# Patient Record
Sex: MALE | Race: WHITE | URBAN - METROPOLITAN AREA
[De-identification: names, ages, dates, MRNs, and addresses within clinical notes are randomized per-mention and may not be internally consistent; named-entity substitution may affect disease eponyms.]

---

## 2017-01-06 ENCOUNTER — TRANSFERRED RECORDS (OUTPATIENT)
Dept: HEALTH INFORMATION MANAGEMENT | Facility: CLINIC | Age: 72
End: 2017-01-06

## 2018-09-07 ENCOUNTER — TRANSFERRED RECORDS (OUTPATIENT)
Dept: HEALTH INFORMATION MANAGEMENT | Facility: CLINIC | Age: 73
End: 2018-09-07

## 2019-04-05 ENCOUNTER — TRANSFERRED RECORDS (OUTPATIENT)
Dept: HEALTH INFORMATION MANAGEMENT | Facility: CLINIC | Age: 74
End: 2019-04-05

## 2019-04-12 ENCOUNTER — TRANSFERRED RECORDS (OUTPATIENT)
Dept: HEALTH INFORMATION MANAGEMENT | Facility: CLINIC | Age: 74
End: 2019-04-12

## 2019-04-19 ENCOUNTER — TRANSFERRED RECORDS (OUTPATIENT)
Dept: HEALTH INFORMATION MANAGEMENT | Facility: CLINIC | Age: 74
End: 2019-04-19

## 2019-09-30 ENCOUNTER — TRANSFERRED RECORDS (OUTPATIENT)
Dept: HEALTH INFORMATION MANAGEMENT | Facility: CLINIC | Age: 74
End: 2019-09-30

## 2019-10-02 ENCOUNTER — TRANSFERRED RECORDS (OUTPATIENT)
Dept: HEALTH INFORMATION MANAGEMENT | Facility: CLINIC | Age: 74
End: 2019-10-02

## 2019-10-03 ENCOUNTER — TRANSFERRED RECORDS (OUTPATIENT)
Dept: HEALTH INFORMATION MANAGEMENT | Facility: CLINIC | Age: 74
End: 2019-10-03

## 2019-10-08 ENCOUNTER — APPOINTMENT (OUTPATIENT)
Dept: RADIATION THERAPY | Facility: OUTPATIENT CENTER | Age: 74
End: 2019-10-08
Payer: MEDICARE

## 2019-10-08 ENCOUNTER — OFFICE VISIT (OUTPATIENT)
Dept: RADIATION THERAPY | Facility: OUTPATIENT CENTER | Age: 74
End: 2019-10-08
Payer: MEDICARE

## 2019-10-08 VITALS
RESPIRATION RATE: 16 BRPM | HEART RATE: 92 BPM | DIASTOLIC BLOOD PRESSURE: 59 MMHG | WEIGHT: 203.8 LBS | OXYGEN SATURATION: 97 % | SYSTOLIC BLOOD PRESSURE: 126 MMHG

## 2019-10-08 DIAGNOSIS — C61 PROSTATE CANCER (H): ICD-10-CM

## 2019-10-08 DIAGNOSIS — I10 HTN (HYPERTENSION): ICD-10-CM

## 2019-10-08 DIAGNOSIS — E07.9 DISORDER OF THYROID: ICD-10-CM

## 2019-10-08 DIAGNOSIS — C61 PROSTATE CANCER (H): Primary | ICD-10-CM

## 2019-10-08 PROBLEM — C79.51 SECONDARY MALIGNANT NEOPLASM OF BONE (H): Status: ACTIVE | Noted: 2019-10-08

## 2019-10-08 RX ORDER — LEVOTHYROXINE SODIUM 112 UG/1
112 TABLET ORAL DAILY
COMMUNITY
Start: 2018-07-31

## 2019-10-08 RX ORDER — CYANOCOBALAMIN 1000 UG/ML
1000 INJECTION, SOLUTION INTRAMUSCULAR; SUBCUTANEOUS
COMMUNITY
Start: 2019-11-18

## 2019-10-08 RX ORDER — ASCORBIC ACID 500 MG
500 TABLET ORAL DAILY
COMMUNITY
Start: 2019-04-11

## 2019-10-08 RX ORDER — ASPIRIN 81 MG/1
81 TABLET ORAL DAILY
COMMUNITY
Start: 2018-08-30

## 2019-10-08 RX ORDER — SIMVASTATIN 20 MG
20 TABLET ORAL DAILY
COMMUNITY
Start: 2018-07-31

## 2019-10-08 RX ORDER — PREDNISONE 10 MG/1
10 TABLET ORAL DAILY
COMMUNITY
Start: 2019-10-03

## 2019-10-08 RX ORDER — ABIRATERONE 500 MG/1
1000 TABLET ORAL DAILY
COMMUNITY
Start: 2019-10-03 | End: 2021-04-19

## 2019-10-08 RX ORDER — CARBAMAZEPINE 300 MG/1
300 CAPSULE, EXTENDED RELEASE ORAL 2 TIMES DAILY
COMMUNITY
Start: 2019-01-28

## 2019-10-08 RX ORDER — ZOSTER VACCINE RECOMBINANT, ADJUVANTED 50 MCG/0.5
KIT INTRAMUSCULAR
Refills: 1 | COMMUNITY
Start: 2019-08-19

## 2019-10-08 RX ORDER — IBUPROFEN 200 MG
200 TABLET ORAL EVERY 4 HOURS PRN
COMMUNITY

## 2019-10-08 RX ORDER — LISINOPRIL/HYDROCHLOROTHIAZIDE 10-12.5 MG
1 TABLET ORAL DAILY
COMMUNITY
Start: 2018-07-31

## 2019-10-08 RX ORDER — ZOLEDRONIC ACID 0.04 MG/ML
4 INJECTION, SOLUTION INTRAVENOUS
COMMUNITY

## 2019-10-08 NOTE — LETTER
10/8/2019      RE: Duane A Gabrielson  [address] [address] [address]       Patient underwent CT simulation.     Eldon Bar M.D.  Department of Radiation Oncology  [facility]       Eldon Bar MD

## 2019-10-08 NOTE — PROGRESS NOTES
Department of Radiation Oncology  Radiation Therapy Center  [facility]  [address], [address]  [place] [address]  [phone]       Consultation Note    Name: Duane A Gabrielson MRN: [id number]   : 1945   Date of Service: 10/8/2019  Referring: Dr. Catalan     Reason for consultation: Metastatic prostate cancer to bone associated with right pelvic pain.  Evaluate role for palliative radiation therapy.    History of Present Illness   Mr. Peters is a 73 year old male diagnosed metastatic prostate cancer to bone associated with right pelvic pain.  He presents today to discuss potential role of palliative radiation therapy.    Patient was initially diagnosed with prostate cancer in  when he underwent prostatectomy.  Reported pathology demonstrated prostate adenocarcinoma, Eligio score 3+4 = 7, negative margins, no SUKHDEV or SVI.  The patient had biochemical recurrence in  and underwent salvage prostate bed radiation therapy to a total dose of 70.2 Gy in 39 fractions, completed on 2005.  There is initial PSA response.  Patient had biochemical progression in  and ultimately required initiation of systemic therapy with Lupron.  The patient has since been on Lupron every 4 months since .  Most recent bone scan on 2019 demonstrated widespread osseous metastatic disease in the spine, sternum, and bilateral pelvis, right greater than left.  More recent PSA on 2019 was 212, which demonstrated  increase from prior values and is ultimately suggestive of castrate resistant disease.  The patient was subsequently seen by Dr. Centeno who discussed consideration of treatment with abiraterone.  More recently patient presented with increasing right persistent posterior pelvic/buttock pain for the past 2 months.  Patient notes pain primarily with sitting position in the car and/or on the toilet seat.  He denies any associated radiculopathy.  Currently pain  is a level 6-7 out of 10, managed with ibuprofen 3 times a day.  The patient states he overall tolerated his prior course of radiation very well.  He presents today to discuss potential role of palliative radiation therapy was painful bone metastasis.        Past Medical History:   No past medical history on file.    Past Surgical History:   No past surgical history on file.    Chemotherapy History:  Per HPI    Radiation History:  70.2 Gy in 39 fractions to prostate bed, completed RT on       Pregnant: Not Applicable  Implanted Cardiac Devices: No    Medications:  No current outpatient medications on file.     No current facility-administered medications for this visit.          Allergies:   Allergies not on file      Family History:  No family history on file.    Review of Systems   A 10-point review of systems was performed. Pertinent findings are noted in the HPI.    Physical Exam   ECOG Status: 1    Vitals:  There were no vitals taken for this visit.    Gen: Alert, in NAD  Head: NC/AT  Eyes: PERRL, EOMI, sclera anicteric  Ears: No external auricular lesions  Nose/sinus: No rhinorrhea or epistaxis  Oral cavity/oropharynx: MMM, no visible oral cavity lesions, FOM and BOT are soft to palpation  Neck: Full ROM, supple, no palpable adenopathy  Pulm: No wheezing, stridor or respiratory distress  CV: Extremities are warm and well-perfused, no cyanosis, no pedal edema  Abdominal: Normal bowel sounds, soft, nontender, no masses  Musculoskeletal: +TTP in right ischium region.  Skin: Normal color and turgor  Neuro: A/Ox3, CN II-XII intact, normal gait    Imaging/Path/Labs   Imagin19  Bone scan  FINDINGS: Normal blood flow and blood pool tracer uptake about both hips with no  evidence of hyperemia to suggest acute fracture or infection. Delayed phase  whole body and pelvic images demonstrate widespread osseous metastases  throughout the axial and proximal appendicular skeleton. Degree of uptake  relative to  background has decreased since 04/19/2019 suggesting favorable  treatment response. Sites are scattered throughout the spine, ribs, arms, bony  pelvis and legs. Perineal surface contamination.        9/30/19  CTCAP    CONCLUSION:  1.  Progression of sclerotic metastatic disease throughout the skeleton.  2.  Slight enlargement of a few small lymph nodes of the right pelvic sidewall  and external iliac chain. Involvement with disease is not excluded. Attention on  follow-up recommended.        Path:   Per HPI    Labs:       Assessment    Mr. Peters is a 73 year old male diagnosed metastatic prostate cancer to bone associated with right pelvic pain.  He presents today to discuss potential role of palliative radiation therapy.    Plan     1.  The patient reports predominantly pain in his right posterior pelvic region, likely corresponding to his right ischium.  This pain is affecting his overall quality of life.  As such I recommend a course of palliative radiation therapy with goal to improve the patient's current pain symptoms.    2.  have reviewed prior radiation records, and I recommend proceeding with palliative radiation therapy. I recommend a total dose of 4 Gy x 5.  Consent was obtained today.  CT simulation was performed.    3.  We will start treatment the following Monday (10/14/19) with plan to finish at the end of the week.  Patient agrees with current plan in place.      Eldon Bar MD  Department of Radiation Oncology  [facility]

## 2019-10-08 NOTE — LETTER
10/8/2019      RE: Duane A Gabrielson  [address] [address] [address]          Department of Radiation Oncology  Radiation Therapy Center  [facility]  [address], [address]  [place] [address] [phone]       Consultation Note    Name: Duane A Gabrielson MRN: [id number]   : 1945   Date of Service: 10/8/2019  Referring: Dr. Catalan     Reason for consultation: Metastatic prostate cancer to bone associated with right pelvic pain.  Evaluate role for palliative radiation therapy.    History of Present Illness   Mr. Peters is a 73 year old male diagnosed metastatic prostate cancer to bone associated with right pelvic pain.  He presents today to discuss potential role of palliative radiation therapy.    Patient was initially diagnosed with prostate cancer in  when he underwent prostatectomy.  Reported pathology demonstrated prostate adenocarcinoma, Eligio score 3+4 = 7, negative margins, no SUKHDEV or SVI.  The patient had biochemical recurrence in  and underwent salvage prostate bed radiation therapy to a total dose of 70.2 Gy in 39 fractions, completed on 2005.  There is initial PSA response.  Patient had biochemical progression in  and ultimately required initiation of systemic therapy with Lupron.  The patient has since been on Lupron every 4 months since .  Most recent bone scan on 2019 demonstrated widespread osseous metastatic disease in the spine, sternum, and bilateral pelvis, right greater than left.  More recent PSA on 2019 was 212, which demonstrated  increase from prior values and is ultimately suggestive of castrate resistant disease.  The patient was subsequently seen by Dr. Centeno who discussed consideration of treatment with abiraterone.  More recently patient presented with increasing right persistent posterior pelvic/buttock pain for the past 2 months.  Patient notes pain primarily with sitting position in the car  and/or on the toilet seat.  He denies any associated radiculopathy.  Currently pain is a level 6-7 out of 10, managed with ibuprofen 3 times a day.  The patient states he overall tolerated his prior course of radiation very well.  He presents today to discuss potential role of palliative radiation therapy was painful bone metastasis.        Past Medical History:   No past medical history on file.    Past Surgical History:   No past surgical history on file.    Chemotherapy History:  Per HPI    Radiation History:  70.2 Gy in 39 fractions to prostate bed, completed RT on       Pregnant: Not Applicable  Implanted Cardiac Devices: No    Medications:  No current outpatient medications on file.     No current facility-administered medications for this visit.          Allergies:   Allergies not on file      Family History:  No family history on file.    Review of Systems   A 10-point review of systems was performed. Pertinent findings are noted in the HPI.    Physical Exam   ECOG Status: 1    Vitals:  There were no vitals taken for this visit.    Gen: Alert, in NAD  Head: NC/AT  Eyes: PERRL, EOMI, sclera anicteric  Ears: No external auricular lesions  Nose/sinus: No rhinorrhea or epistaxis  Oral cavity/oropharynx: MMM, no visible oral cavity lesions, FOM and BOT are soft to palpation  Neck: Full ROM, supple, no palpable adenopathy  Pulm: No wheezing, stridor or respiratory distress  CV: Extremities are warm and well-perfused, no cyanosis, no pedal edema  Abdominal: Normal bowel sounds, soft, nontender, no masses  Musculoskeletal: +TTP in right ischium region.  Skin: Normal color and turgor  Neuro: A/Ox3, CN II-XII intact, normal gait    Imaging/Path/Labs   Imagin19  Bone scan  FINDINGS: Normal blood flow and blood pool tracer uptake about both hips with no  evidence of hyperemia to suggest acute fracture or infection. Delayed phase  whole body and pelvic images demonstrate widespread osseous metastases  throughout  the axial and proximal appendicular skeleton. Degree of uptake  relative to background has decreased since 04/19/2019 suggesting favorable  treatment response. Sites are scattered throughout the spine, ribs, arms, bony  pelvis and legs. Perineal surface contamination.        9/30/19  CTCAP    CONCLUSION:  1.  Progression of sclerotic metastatic disease throughout the skeleton.  2.  Slight enlargement of a few small lymph nodes of the right pelvic sidewall  and external iliac chain. Involvement with disease is not excluded. Attention on  follow-up recommended.        Path:   Per HPI    Labs:       Assessment    Mr. Peters is a 73 year old male diagnosed metastatic prostate cancer to bone associated with right pelvic pain.  He presents today to discuss potential role of palliative radiation therapy.    Plan     1.  The patient reports predominantly pain in his right posterior pelvic region, likely corresponding to his right ischium.  This pain is affecting his overall quality of life.  As such I recommend a course of palliative radiation therapy with goal to improve the patient's current pain symptoms.    2.  have reviewed prior radiation records, and I recommend proceeding with palliative radiation therapy. I recommend a total dose of 4 Gy x 5.  Consent was obtained today.  CT simulation was performed.    3.  We will start treatment the following Monday (10/14/19) with plan to finish at the end of the week.  Patient agrees with current plan in place.      Eldon Bar MD  Department of Radiation Oncology  [facility]       Eldon Bar MD

## 2019-10-08 NOTE — PROGRESS NOTES
Patient underwent CT simulation.     Eldon Bar M.D.  Department of Radiation Oncology  [facility]

## 2019-10-08 NOTE — NURSING NOTE
REASON FOR APPOINTMENT   Metastatic prostate cancer with widespread mets. Having pelvic bony pain.   Here to discuss RT.  TREATMENT TO-DATE FOR THIS CANCER  Surgery ? Prostatectomy 2004   Chemotherapy ? Immunotherapy currently w/Dr. Catalan   Radiation therapy: Treated at [facility] 2005, 7020 cGy in 39 fx    PERSONAL HISTORY OF CANCER   Previous Cancer ? no   Prior Radiation ? See above   Prior Chemotherapy ? no   Prior Hormonal Therapy ? no     REFERRALS NEEDED  Not at this time    VITALS  /59 (BP Location: Left arm, Cuff Size: Adult Large)   Pulse 92   Resp 16   Wt 92.4 kg (203 lb 12.8 oz)   SpO2 97%     PACEMAKER/IMPLANTED CARDIAC DEVICE : No    PAIN  Right buttock/bone pain. Rates 6-8/10 in certain sitting positions.   Only taking advil 200mg 2-3 times per day    PSYCHOSOCIAL  Marital Status: single, no children  Patient lives in [place], [place]   Working status: retired  Do you feel safe in your home? Yes    REVIEW OF SYSTEMS  Skin: negative  Eyes: negative  Ears/Nose/Throat: negative  Respiratory: No shortness of breath, dyspnea on exertion, cough, or hemoptysis  Cardiovascular: negative  Gastrointestinal: negative  Genitourinary: nocturia x 1  Musculoskeletal: arthritis  Neurologic: negative  Psychiatric: negative  Hematologic/Lymphatic/Immunologic: negative  Endocrine: negative    Radiation Oncology Patient Teaching    Person involved with teaching: Patient and his sister  Patient asked Questions: Yes  Patient was cooperative: Yes  Patient was receptive (willing to accept information given): Yes    Education Assessment  Comprehension ability: High  Knowledge level: Medium  Factors affecting teaching: None    Education Materials Given  Caring for Your Skin During Radiation ...  Disease Specific Booklet  Eating Hints  Diet and Nutrition Information    Educational Topics Discussed  Disease process, Side effects, Medications, Pain management, Activity, Adjustment to illness and Community  resources    Response To Teaching  Verbalizes understanding    Do you have an advanced directive or living will? no  Are you DNR/DNI? no

## 2019-10-14 ENCOUNTER — APPOINTMENT (OUTPATIENT)
Dept: RADIATION THERAPY | Facility: OUTPATIENT CENTER | Age: 74
End: 2019-10-14
Payer: MEDICARE

## 2019-10-15 ENCOUNTER — APPOINTMENT (OUTPATIENT)
Dept: RADIATION THERAPY | Facility: OUTPATIENT CENTER | Age: 74
End: 2019-10-15
Payer: MEDICARE

## 2019-10-16 ENCOUNTER — OFFICE VISIT (OUTPATIENT)
Dept: RADIATION THERAPY | Facility: OUTPATIENT CENTER | Age: 74
End: 2019-10-16
Payer: MEDICARE

## 2019-10-16 ENCOUNTER — APPOINTMENT (OUTPATIENT)
Dept: RADIATION THERAPY | Facility: OUTPATIENT CENTER | Age: 74
End: 2019-10-16
Payer: MEDICARE

## 2019-10-16 VITALS
OXYGEN SATURATION: 99 % | SYSTOLIC BLOOD PRESSURE: 116 MMHG | DIASTOLIC BLOOD PRESSURE: 59 MMHG | WEIGHT: 191.6 LBS | RESPIRATION RATE: 18 BRPM | HEART RATE: 82 BPM

## 2019-10-16 DIAGNOSIS — C61 PROSTATE CANCER (H): Primary | ICD-10-CM

## 2019-10-16 NOTE — PROGRESS NOTES
[facility]  Radiation Therapy    On Treatment Visit Note      Duane A Gabrielson      Date: 10/16/2019   MRN: [id number]   : 1945  Diagnosis: Metastatic prostate cancer      Primary Care Physician   No primary care provider on file.      Reason for Visit:  On Radiation Treatment Visit -Radiation to metastatic prostate cancer of right pevic bone  Prostatectomy in .  Pelvic radiation in  at time of biochemical recurrence, with improvement in PSA.  Intermittent Lupron, beginning in , approximately annually.  Lupron every 4 months beginning May 2009.  Casodex begun May, 2019, ongoing.  On Zometa 4 mg every 4 months  On oral Zytega - daily with  prednisone    Treatment Summary to Date  Treatment Site: R pelvis Current Dose: 1200/2000 cGy Fractions: 3/5        Chemotherapy  Chemo concurrent with radx?: Yes  Oncologist: Dr. Catalan  Drug Name/Frequency 1: Zytega - daily, prednisone    Nursing ROS:   Nutrition Alteration  Diet Type: Patient's Preference  Skin  Skin Reaction: 0 - No changes    Cardiovascular  Respiratory effort: 2 - Mild dyspnea on exertion  Gastrointestinal  Nausea: 0 - None  Diarrhea: 0 - None     Pain Assessment  0-10 Pain Scale: (pt did not rate pain)  Pain Note: takes IBU 2x/day which manages pain. feels small improvement since starting radiation    Subjective: See nursing ROS.  Pain is tolerable  Poor appetite    Objective:   /59   Pulse 82   Resp 18   Wt 86.9 kg (191 lb 9.6 oz)   SpO2 99%   Gen: Oriented and mentally alert       Labs:  CBC RESULTS: No results for input(s): WBC, RBC, HGB, HCT, MCV, MCH, MCHC, RDW, PLT in the last 64215 hours.  ELECTROLYTES:  No results for input(s): NA, POTASSIUM, CHLORIDE, SALVATORE, CO2, BUN, CR, GLC in the last 74335 hours.    Assessment:    Tolerating radiation therapy well.  All questions and concerns addressed.    Port cheked    Plan:   1. Continue current therapy.    2  Two  more fractions to go..          Lobo Aguiar MD

## 2019-10-16 NOTE — LETTER
10/16/2019      RE: Duane A Gabrielson  [address] [address] [address]       [facility]  Radiation Therapy    On Treatment Visit Note      Duane A Gabrielson      Date: 10/16/2019   MRN: [id number]   : 1945  Diagnosis: Metastatic prostate cancer      Primary Care Physician   No primary care provider on file.      Reason for Visit:  On Radiation Treatment Visit -Radiation to metastatic prostate cancer of right pevic bone  Prostatectomy in .  Pelvic radiation in  at time of biochemical recurrence, with improvement in PSA.  Intermittent Lupron, beginning in , approximately annually.  Lupron every 4 months beginning May 2009.  Casodex begun May, 2019, ongoing.  On Zometa 4 mg every 4 months  On oral Zytega - daily with  prednisone    Treatment Summary to Date  Treatment Site: R pelvis Current Dose: 1200/2000 cGy Fractions: 3/5        Chemotherapy  Chemo concurrent with radx?: Yes  Oncologist: Dr. Catalan  Drug Name/Frequency 1: Zytega - daily, prednisone    Nursing ROS:   Nutrition Alteration  Diet Type: Patient's Preference  Skin  Skin Reaction: 0 - No changes    Cardiovascular  Respiratory effort: 2 - Mild dyspnea on exertion  Gastrointestinal  Nausea: 0 - None  Diarrhea: 0 - None     Pain Assessment  0-10 Pain Scale: (pt did not rate pain)  Pain Note: takes IBU 2x/day which manages pain. feels small improvement since starting radiation    Subjective: See nursing ROS.  Pain is tolerable  Poor appetite    Objective:   /59   Pulse 82   Resp 18   Wt 86.9 kg (191 lb 9.6 oz)   SpO2 99%   Gen: Oriented and mentally alert       Labs:  CBC RESULTS: No results for input(s): WBC, RBC, HGB, HCT, MCV, MCH, MCHC, RDW, PLT in the last 46894 hours.  ELECTROLYTES:  No results for input(s): NA, POTASSIUM, CHLORIDE, SALVATORE, CO2, BUN, CR, GLC in the last 83466 hours.    Assessment:    Tolerating radiation therapy well.  All questions and concerns addressed.    Port cheked    Plan:   1. Continue  current therapy.    2  Two  more fractions to go..          MD Lobo Perez MD

## 2019-10-17 ENCOUNTER — APPOINTMENT (OUTPATIENT)
Dept: RADIATION THERAPY | Facility: OUTPATIENT CENTER | Age: 74
End: 2019-10-17
Payer: MEDICARE

## 2019-10-18 ENCOUNTER — APPOINTMENT (OUTPATIENT)
Dept: RADIATION THERAPY | Facility: OUTPATIENT CENTER | Age: 74
End: 2019-10-18
Payer: MEDICARE

## 2019-10-22 ENCOUNTER — DOCUMENTATION ONLY (OUTPATIENT)
Dept: RADIATION THERAPY | Facility: OUTPATIENT CENTER | Age: 74
End: 2019-10-22

## 2019-10-22 NOTE — PROGRESS NOTES
Department of Radiation Oncology  Radiation Therapy Center  [facility] [place] [address]  [phone]       Radiotherapy Treatment Summary          2019    PATIENT: Duane A Gabrielson  MEDICAL RECORD NO: [id number]   : 1945    DIAGNOSIS: metastatic prostate cancer to bone associated with right pelvic pain.   INTENT OF RADIOTHERAPY: palliative  PATHOLOGY: prostate adenocarcinoma                             STAGE: IV  CONCURRENT SYSTEMIC THERAPY:  Oncologist: Dr. Catalan  On oral Zytega - daily with  prednisone    ONCOLOGIC HISTORY:  Mr. Peters is a 73 year old male diagnosed metastatic prostate cancer to bone associated with right pelvic pain.      Patient was initially diagnosed with prostate cancer in  when he underwent prostatectomy.  Reported pathology demonstrated prostate adenocarcinoma, Eligio score 3+4 = 7, negative margins, no SUKHDEV or SVI.  The patient had biochemical recurrence in  and underwent salvage prostate bed radiation therapy to a total dose of 70.2 Gy in 39 fractions, completed on 2005.  There was initial PSA response.  Patient had biochemical progression in  and ultimately required initiation of systemic therapy with Lupron.  The patient has since been on Lupron every 4 months since .  Most recent bone scan on 2019 demonstrated widespread osseous metastatic disease in the spine, sternum, and bilateral pelvis, right greater than left.  More recent PSA on 2019 was 212, which demonstrated  increase from prior values and is ultimately suggestive of castrate resistant disease.  The patient was subsequently seen by Dr. Centeno who discussed consideration of treatment with abiraterone.  More recently patient presented with increasing right persistent posterior pelvic/buttock pain for the past 2 months.  Patient notes pain primarily with sitting position in the car and/or on the toilet seat.  He denies any associated  radiculopathy.  Currently pain is a level 6-7 out of 10, managed with ibuprofen 3 times a day.           SITE OF TREATMENT: Right pelvis    DATES  OF TREATMENT: 10/14/19-10/18/19    TOTAL DOSE OF TREATMENT: 2000 cGy    DOSE PER FRACTION OF TREATMENT: 400 cGy x 5 fractions       COMMENT/TOXICITY:    Skin Reaction: 0 - No changes  Nausea: 0 - None  Diarrhea: 0 - None    Feels small  improvement in pain since starting radiation.                 PAIN MANAGEMENT:    Continues on ibuprofen 200 mg Q 4 hrs prn                       FOLLOW UP PLAN:  1.  RTC one month    Radiation Oncologist: Eldon Bar M.D.  Department of Radiation Oncology  [facility]

## 2019-11-21 ENCOUNTER — OFFICE VISIT (OUTPATIENT)
Dept: RADIATION THERAPY | Facility: OUTPATIENT CENTER | Age: 74
End: 2019-11-21
Payer: MEDICARE

## 2019-11-21 VITALS
SYSTOLIC BLOOD PRESSURE: 134 MMHG | DIASTOLIC BLOOD PRESSURE: 76 MMHG | WEIGHT: 189.8 LBS | HEART RATE: 98 BPM | OXYGEN SATURATION: 98 % | RESPIRATION RATE: 18 BRPM

## 2019-11-21 DIAGNOSIS — C61 PROSTATE CANCER (H): Primary | ICD-10-CM

## 2019-11-21 RX ORDER — HYDROCODONE BITARTRATE AND ACETAMINOPHEN 5; 325 MG/1; MG/1
0.5 TABLET ORAL EVERY 4 HOURS PRN
COMMUNITY
Start: 2019-11-13 | End: 2021-04-19

## 2019-11-21 RX ORDER — TRAMADOL HYDROCHLORIDE 50 MG/1
1 TABLET ORAL 2 TIMES DAILY PRN
COMMUNITY
Start: 2019-11-07

## 2019-11-21 NOTE — LETTER
"2019    RE: Duane A Gabrielson  [address] [address] [address]     [facility] [place]  Radiation Oncology Follow-up Note  2019    Duane A Gabrielson  MRN: [id number]  : 1945    DISEASE TREATED: Adenocarcinoma of the prostate, metastatic to bone  Stage: IV    SITE OF TREATMENT: Right pelvis  DATES  OF TREATMENT: 10/14/19-10/18/19     TOTAL DOSE OF TREATMENT: 2000 cGy  DOSE PER FRACTION OF TREATMENT: 400 cGy x 5 fractions    INTERVAL SINCE COMPLETION OF RADIOTHERAPY: 1 Month    SUBJECTIVE: Mr. Peters is a [age] year old  gentleman with metastatic prostate cancer. He was initially diagnosed with intermediate risk disease in , for which he receive upfront prostatectomy. He experienced biochemical recurrence in , and received salvage RT, but ultimately was found to again have rising PSA in . Since that time he has been on hormonal and systemic therapy, and became castrate resistant several months ago. He now has diffuse bony metastatic disease, and received palliative RT to a particularly painful right pelvic lesion in 10/2019. He continues to follow closely with his medical oncologist (Dr. Cody) and it appears that his PSA has significantly declined since beginning abiaterone and prednisone. At a recent visit with her, Mr. Peters admitted to new acute on chronic left buttock and left anterior pelvic pain.    Today he states that this new left pelvic pain is becoming more bothersome for him. There is a small area on the back of his left hip that become particularly bothersome while sitting. He rates the pain as 5/10. He also has pain and tenderness on the left side of the pubic symphysis. He is taking low dose Norco right now, which he affirms adequately address his current pain. He denies any right sided hip pain and believes his previous radiation course \"did the trick\" for that site. He tolerated his previous XRT treatment course " very well.    OBJECTIVE: A/Ox3. NAD. No TTP of the right hip or sacrum. TTP of the left sacrum and left pubic symphysis appreciated.    Bone scan: 9/27/2019    FINDINGS: Normal blood flow and blood pool tracer uptake about both hips with no  evidence of hyperemia to suggest acute fracture or infection. Delayed phase  whole body and pelvic images demonstrate widespread osseous metastases  throughout the axial and proximal appendicular skeleton. Degree of uptake  relative to background has decreased since 04/19/2019 suggesting favorable  treatment response. Sites are scattered throughout the spine, ribs, arms, bony  pelvis and legs. Perineal surface contamination.                                       ASSESSMENT AND PLAN:  Mr. Peters is a [age] year old gentleman with metastatic prostate cancer. He has had a good response to palliative right hip radiotherapy, and has developed minimal toxicity from treatment. He has a new complaint of left-sided anterior and posterior hip pain, and review of his recent CT simulation scan shows bony lesions with anatomic correlation with the sites of new pain. Thus we have offered a 2nd course of palliative RT (20Gy in 5 fractions) to the left pubic symphysis and ischium. The goal of this new treatment will be to assist in improved pain relief tin the left hip. The patient consented to therapy and will be scheduled to undergo CT simulation with us next week.  Lizandro Lucero MD-PhD PGY-4  Radiation Oncology Resident, [facility]  [phone]    I was present with the resident during the visit. I discussed the case with the resident and agree with the note as documented by the resident.    Eldon Bar M.D.  Department of Radiation Oncology  [facility]      Eldon Bar MD

## 2019-11-21 NOTE — PROGRESS NOTES
"    [facility], [place]  Radiation Oncology Follow-up Note  2019    Duane A Gabrielson  MRN: [id number]  : 1945    DISEASE TREATED: Adenocarcinoma of the prostate, metastatic to bone  Stage: IV    SITE OF TREATMENT: Right pelvis  DATES  OF TREATMENT: 10/14/19-10/18/19     TOTAL DOSE OF TREATMENT: 2000 cGy  DOSE PER FRACTION OF TREATMENT: 400 cGy x 5 fractions    INTERVAL SINCE COMPLETION OF RADIOTHERAPY: 1 Month    SUBJECTIVE: Mr. Peters is a 74 year old  gentleman with metastatic prostate cancer. He was initially diagnosed with intermediate risk disease in , for which he receive upfront prostatectomy. He experienced biochemical recurrence in , and received salvage RT, but ultimately was found to again have rising PSA in . Since that time he has been on hormonal and systemic therapy, and became castrate resistant several months ago. He now has diffuse bony metastatic disease, and received palliative RT to a particularly painful right pelvic lesion in 10/2019. He continues to follow closely with his medical oncologist (Dr. Cody) and it appears that his PSA has significantly declined since beginning abiaterone and prednisone. At a recent visit with her, Mr. Peters admitted to new acute on chronic left buttock and left anterior pelvic pain.    Today he states that this new left pelvic pain is becoming more bothersome for him. There is a small area on the back of his left hip that become particularly bothersome while sitting. He rates the pain as 5/10. He also has pain and tenderness on the left side of the pubic symphysis. He is taking low dose Norco right now, which he affirms adequately address his current pain. He denies any right sided hip pain and believes his previous radiation course \"did the trick\" for that site. He tolerated his previous XRT treatment course very well.    OBJECTIVE: A/Ox3. NAD. No TTP of the right hip or sacrum. TTP of " the left sacrum and left pubic symphysis appreciated.    Bone scan: 9/27/2019    FINDINGS: Normal blood flow and blood pool tracer uptake about both hips with no  evidence of hyperemia to suggest acute fracture or infection. Delayed phase  whole body and pelvic images demonstrate widespread osseous metastases  throughout the axial and proximal appendicular skeleton. Degree of uptake  relative to background has decreased since 04/19/2019 suggesting favorable  treatment response. Sites are scattered throughout the spine, ribs, arms, bony  pelvis and legs. Perineal surface contamination.                                       ASSESSMENT AND PLAN:  Mr. Peters is a 74 year old gentleman with metastatic prostate cancer. He has had a good response to palliative right hip radiotherapy, and has developed minimal toxicity from treatment. He has a new complaint of left-sided anterior and posterior hip pain, and review of his recent CT simulation scan shows bony lesions with anatomic correlation with the sites of new pain. Thus we have offered a 2nd course of palliative RT (20Gy in 5 fractions) to the left pubic symphysis and ischium. The goal of this new treatment will be to assist in improved pain relief tin the left hip. The patient consented to therapy and will be scheduled to undergo CT simulation with us next week.  Lizandro Lucero MD-PhD PGY-4  Radiation Oncology Resident, [facility]  [phone]    I was present with the resident during the visit. I discussed the case with the resident and agree with the note as documented by the resident.    Eldon Bar M.D.  Department of Radiation Oncology  [facility]

## 2019-11-21 NOTE — NURSING NOTE
FOLLOW-UP VISIT    Patient Name: Duane A Gabrielson      : 1945     Age: 74 year old        ______________________________________________________________________________     Chief Complaint   Patient presents with     Radiation Therapy     follow up     /76   Pulse 98   Resp 18   Wt 86.1 kg (189 lb 12.8 oz)   SpO2 98%      Date Radiation Completed: 1 month ago    Pain  Current history of pain associated with this visit:   Intensity: Patient is unable to quantify.  Current: lump/walnut when sitting, pain/pressure when standing  Location: L pelvis  Treatment: norco 1/2 tab 2x/day prn, tramadol prn    Meds  Current Med List Reviewed: Yes  Medication Note:     Labs  Other Labs: No    Imaging  None    Skin: Warm  Dry  Intact  Energy Level: improving - recently in hospital due to hgb 7, UTI, fall.  Blood transfusion, pain managed, abx complete - energy and activity improving    Other Appointments:     Date  MD Name: Sabrina Augustin  visit last week     Other Notes:

## 2019-11-26 ENCOUNTER — OFFICE VISIT (OUTPATIENT)
Dept: RADIATION THERAPY | Facility: OUTPATIENT CENTER | Age: 74
End: 2019-11-26
Payer: MEDICARE

## 2019-11-26 DIAGNOSIS — C61 PROSTATE CANCER (H): Primary | ICD-10-CM

## 2019-11-26 NOTE — LETTER
11/26/2019      RE: Duane A Gabrielson  [address] [facility] [address]       Patient underwent CT simulation.     Eldon Bar M.D.  Department of Radiation Oncology  [facility]

## 2019-11-26 NOTE — PROGRESS NOTES
Patient underwent CT simulation.     Eldon Bar M.D.  Department of Radiation Oncology  [facility]

## 2019-12-04 ENCOUNTER — APPOINTMENT (OUTPATIENT)
Dept: RADIATION THERAPY | Facility: OUTPATIENT CENTER | Age: 74
End: 2019-12-04
Payer: MEDICARE

## 2019-12-04 ENCOUNTER — OFFICE VISIT (OUTPATIENT)
Dept: RADIATION THERAPY | Facility: OUTPATIENT CENTER | Age: 74
End: 2019-12-04
Payer: MEDICARE

## 2019-12-04 VITALS — HEART RATE: 84 BPM | SYSTOLIC BLOOD PRESSURE: 122 MMHG | DIASTOLIC BLOOD PRESSURE: 68 MMHG | WEIGHT: 182.4 LBS

## 2019-12-04 DIAGNOSIS — C61 PROSTATE CANCER (H): Primary | ICD-10-CM

## 2019-12-04 NOTE — LETTER
2019      RE: Duane A Gabrielson  [address] [address] [address]       [facility] PHYSICIANS  SPECIALIZING IN BREAKTHROUGHS  Radiation Oncology    On Treatment Visit Note    Duane A Gabrielson      Date: 2019   MRN: [id number]   : 1945    Reason for Visit:  On Radiation Treatment Visit     Diagnosis: Metastatic prostate cancer    Plan for palliative XRT to the right pelvis    Treatment Summary to Date  Site Treated: Left pubic symphasis and ischium Current Dose: 400cGy/2000cGy Fraction:      Subjective:   Today Mr. Peters begins his 2nd round of palliative radiotherapy. He states that he is constipated, but is otherwise doing well. His left hip pain is stable, and he denies any  dysfunction. He continues on a minimal amount of Norco.    Objective:   Gen: A/Ox3. NAD  Skin: No erythema    Toxicities: No appreciable treatment related toxicities.    Labs: No interval labs    Assessment:   Mr. Peters is a 74 year old gentleman with metastatic prostate cancer.  He has received 2 previous courses of radiation therapy to the pelvis, including salvage prostate RT and palliative right hip XRT. He has developed left-sided anterior and inferior hip pain, and is receiving palliative radiotherapy to those sites.     Tolerating radiation therapy well.  All questions and concerns addressed.    Plan:    1. Continue current therapy.  2. Recommended senokot to address constipation  3. The patient is scheduled to complete palliative XRT next week. RTC as needed    Lizandro Lucero MD-PhD PGY-4  Radiation Oncology Resident, [facility]      Staff Note:  I saw patient with resident and personally talk to patient and his wife.  Mosaic  And image was reviewed. I agree with above note.    MD Lobo Lunsford MD

## 2019-12-04 NOTE — PROGRESS NOTES
[facility] PHYSICIANS  SPECIALIZING IN BREAKTHROUGHS  Radiation Oncology    On Treatment Visit Note    Duane A Gabrielson      Date: 2019   MRN: [id number]   : 1945    Reason for Visit:  On Radiation Treatment Visit     Diagnosis: Metastatic prostate cancer    Plan for palliative XRT to the right pelvis    Treatment Summary to Date  Site Treated: Left pubic symphasis and ischium Current Dose: 400cGy/2000cGy Fraction:      Subjective:   Today Mr. Peters begins his 2nd round of palliative radiotherapy. He states that he is constipated, but is otherwise doing well. His left hip pain is stable, and he denies any  dysfunction. He continues on a minimal amount of Norco.    Objective:   Gen: A/Ox3. NAD  Skin: No erythema    Toxicities: No appreciable treatment related toxicities.    Labs: No interval labs    Assessment:   Mr. Peters is a 74 year old gentleman with metastatic prostate cancer. He has received 2 previous courses of radiation therapy to the pelvis, including salvage prostate RT and palliative right hip XRT. He has developed left-sided anterior and inferior hip pain, and is receiving palliative radiotherapy to those sites.     Tolerating radiation therapy well.  All questions and concerns addressed.    Plan:    1. Continue current therapy.  2. Recommended senokot to address constipation  3. The patient is scheduled to complete palliative XRT next week. RTC as needed    Lizandro Lucero MD-PhD PGY-4  Radiation Oncology Resident, [facility]      Staff Note:  I saw patient with resident and personally talk to patient and his wife.  Mosaic  And image was reviewed. I agree with above note.    Lobo Aguiar MD

## 2019-12-05 ENCOUNTER — APPOINTMENT (OUTPATIENT)
Dept: RADIATION THERAPY | Facility: OUTPATIENT CENTER | Age: 74
End: 2019-12-05
Payer: MEDICARE

## 2019-12-06 ENCOUNTER — APPOINTMENT (OUTPATIENT)
Dept: RADIATION THERAPY | Facility: OUTPATIENT CENTER | Age: 74
End: 2019-12-06
Payer: MEDICARE

## 2019-12-10 ENCOUNTER — APPOINTMENT (OUTPATIENT)
Dept: RADIATION THERAPY | Facility: OUTPATIENT CENTER | Age: 74
End: 2019-12-10
Payer: MEDICARE

## 2019-12-11 ENCOUNTER — OFFICE VISIT (OUTPATIENT)
Dept: RADIATION THERAPY | Facility: OUTPATIENT CENTER | Age: 74
End: 2019-12-11
Payer: MEDICARE

## 2019-12-11 ENCOUNTER — APPOINTMENT (OUTPATIENT)
Dept: RADIATION THERAPY | Facility: OUTPATIENT CENTER | Age: 74
End: 2019-12-11
Payer: MEDICARE

## 2019-12-11 VITALS
OXYGEN SATURATION: 98 % | WEIGHT: 183 LBS | SYSTOLIC BLOOD PRESSURE: 137 MMHG | HEART RATE: 97 BPM | RESPIRATION RATE: 18 BRPM | DIASTOLIC BLOOD PRESSURE: 79 MMHG

## 2019-12-11 DIAGNOSIS — C61 PROSTATE CANCER (H): Primary | ICD-10-CM

## 2019-12-11 NOTE — LETTER
2019      RE: Duane A Gabrielson  [address] [address] [facility] [address]       [facility] PHYSICIANS  SPECIALIZING IN BREAKTHROUGHS  Radiation Oncology    On Treatment Visit Note    Duane A Gabrielson      Date: 2019   MRN: [id number]   : 1945    Reason for Visit:  On Radiation Treatment Visit     Diagnosis: Metastatic prostate cancer    Plan for palliative XRT to the right pelvis    Treatment Summary to Date  Site Treated: Left pubic symphasis and ischium Current Dose: 2000cGy/2000cGy Fraction: 5/5     Subjective:   L pubic symphysis and left ischium pain partially improved since starting RT. Able to sit with slightly less discomfort. Norco x2 per day. No diarrhea. No  bother.     Objective:   Gen: A/Ox3. NAD  Skin: No erythema    Toxicities: No appreciable treatment related toxicities.    Labs: No interval labs    Assessment:   Mr. Peters is a 74 year old gentleman with metastatic prostate cancer. He has received 2 previous courses of radiation therapy to the pelvis, including salvage prostate RT and palliative right hip XRT. He has developed left-sided anterior and inferior hip pain, and is receiving palliative radiotherapy to those sites.     Tolerating radiation therapy well.  All questions and concerns addressed.    Plan:    1. EOT today. RTC in 1 month for post treatment response evaluation.   2. Continue systemic therapy with Dr. Catalan's team.     Eldon Bar M.D.  Department of Radiation Oncology  [facility]            Eldon Bar MD

## 2019-12-11 NOTE — PROGRESS NOTES
[facility] PHYSICIANS  SPECIALIZING IN BREAKTHROUGHS  Radiation Oncology    On Treatment Visit Note    Duane A Gabrielson      Date: 2019   MRN: [id number]   : 1945    Reason for Visit:  On Radiation Treatment Visit     Diagnosis: Metastatic prostate cancer    Plan for palliative XRT to the right pelvis    Treatment Summary to Date  Site Treated: Left pubic symphasis and ischium Current Dose: 2000cGy/2000cGy Fraction: 5/5     Subjective:   L pubic symphysis and left ischium pain partially improved since starting RT. Able to sit with slightly less discomfort. Norco x2 per day. No diarrhea. No  bother.     Objective:   Gen: A/Ox3. NAD  Skin: No erythema    Toxicities: No appreciable treatment related toxicities.    Labs: No interval labs    Assessment:   Mr. Peters is a 74 year old gentleman with metastatic prostate cancer. He has received 2 previous courses of radiation therapy to the pelvis, including salvage prostate RT and palliative right hip XRT. He has developed left-sided anterior and inferior hip pain, and is receiving palliative radiotherapy to those sites.     Tolerating radiation therapy well.  All questions and concerns addressed.    Plan:    1. EOT today. RTC in 1 month for post treatment response evaluation.   2. Continue systemic therapy with Dr. Catalan's team.     Eldon Bar M.D.  Department of Radiation Oncology  [facility]

## 2019-12-13 ENCOUNTER — DOCUMENTATION ONLY (OUTPATIENT)
Dept: RADIATION THERAPY | Facility: OUTPATIENT CENTER | Age: 74
End: 2019-12-13

## 2019-12-13 NOTE — PROGRESS NOTES
Department of Radiation Oncology  Radiation Therapy Center  [facility] [place] [address]  [phone]       Radiotherapy Treatment Summary          19-19    PATIENT: Duane A Gabrielson  MEDICAL RECORD NO: [id number]   : 1945    DIAGNOSIS: Metastatic prostate cancer.  He has received 2 previous courses of radiation therapy to the pelvis, including salvage prostate RT and palliative right hip XRT. He has developed left-sided anterior and inferior hip pain                          STAGE: IV  INTENT OF RADIOTHERAPY: palliative XRT to the right pelvis  CONCURRENT SYSTEMIC THERAPY: No (abiaterone and prednisone with Dr. Cody)    ONCOLOGIC HISTORY:      Mr. Peters is a 74 year old  gentleman with metastatic prostate cancer. He was initially diagnosed with intermediate risk disease in , for which he receive upfront prostatectomy. He experienced biochemical recurrence in , and received salvage RT, but ultimately was found to again have rising PSA in . Since that time he has been on hormonal and systemic therapy, and became castrate resistant several months ago. He now has diffuse bony metastatic disease, and received palliative RT to a particularly painful right pelvic lesion in 10/2019. He continues to follow closely with his medical oncologist (Dr. Cody) and it appears that his PSA has significantly declined since beginning abiaterone and prednisone. At a recent visit with her, Mr. Peters admitted to new acute on chronic left buttock and left anterior pelvic pain.     SITE OF TREATMENT: Left pubic symphasis and ischium    DATES  OF TREATMENT: 19-19    TOTAL DOSE OF TREATMENT: 2000 cGy    DOSE PER FRACTION OF TREATMENT: 400 cGy x 5 fractions       COMMENT/TOXICITY:   Pubic symphysis and left ischium pain partially improved since starting RT. Able to sit with slightly less discomfort. Norco x2 per day. No diarrhea. No  bother.  Skin: No erythema     Toxicities: No appreciable treatment related toxicities.            PAIN MANAGEMENT:   Norco 5-325 mg, 0.5 tab every 4 hours prn                      FOLLOW UP PLAN:  1. RTC in 1 month for post treatment response evaluation.   2. Continue systemic therapy with Medical Oncology.      Radiation Oncologist: Eldon Bar M.D.  Department of Radiation Oncology  [facility]

## 2020-01-09 ENCOUNTER — OFFICE VISIT (OUTPATIENT)
Dept: RADIATION THERAPY | Facility: OUTPATIENT CENTER | Age: 75
End: 2020-01-09
Payer: MEDICARE

## 2020-01-09 VITALS
OXYGEN SATURATION: 98 % | DIASTOLIC BLOOD PRESSURE: 74 MMHG | RESPIRATION RATE: 18 BRPM | SYSTOLIC BLOOD PRESSURE: 128 MMHG | WEIGHT: 185 LBS | HEART RATE: 83 BPM

## 2020-01-09 DIAGNOSIS — R53.83 FATIGUE, UNSPECIFIED TYPE: Primary | ICD-10-CM

## 2020-01-09 DIAGNOSIS — C79.51 SECONDARY MALIGNANT NEOPLASM OF BONE (H): ICD-10-CM

## 2020-01-09 DIAGNOSIS — C61 PROSTATE CANCER (H): ICD-10-CM

## 2020-01-09 NOTE — PROGRESS NOTES
Department of Radiation Oncology  Radiation Therapy Center  [facility] [place] [address]  [phone]       Radiation Oncology Follow-up Visit  2020      Duane A Gabrielson  MRN: [id number]   : 1945     DIAGNOSIS: Metastatic prostate cancer.                    STAGE: IV  INTENT OF RADIOTHERAPY: palliative XRT to      DISEASE TREATED:   Prostate cancer    RADIATION THERAPY DELIVERED:   -The patient had biochemical recurrence in  and underwent salvage prostate bed radiation therapy to a total dose of 70.2 Gy in 39 fractions, completed on 2005.      - 10/18/19: Completed palliative RT to R pelvis, 4 Gy x 5 fractions    - 19: Completed palliative RT to  Left pubic symphysis and left ischium        INTERVAL SINCE COMPLETION OF MOST RADIATION THERAPY:   1 month    SUBJECTIVE:   The patient returns for follow up.     He is overall doing well. He has noticed improvement in the left pubic symphysis and left ischium treated sites. Reduced overall pain medication. Able to sit more comfortably. Has noticed some increase in right ischium pain, but improves with activity and tolerable at this point.    PHYSICAL EXAM:  /74   Pulse 83   Resp 18   Wt 83.9 kg (185 lb)   SpO2 98%   Gen: Alert, in NAD  Eyes: PERRL, EOMI, sclera anicteric  Neck: Supple, full ROM, no LAD  Pulm: No wheezing, stridor or respiratory distress  CV: Well-perfused, no cyanosis, no pedal edema  Abdominal: Soft, nontender, nondistended, no hepatomegaly  Back: No step-offs or pain to palpation along the thoracolumbar spine, no CVA tenderness  Musculoskeletal: Improved left pubic symphasis and left ischium pain. ?  right ischium pain, improves with activity.  Skin: Normal color and turgor  Neurologic: A/Ox3, CN II-XII intact  Psychiatric: Appropriate mood and affect    LABS AND IMAGING:  Reviewed.    IMPRESSION:   Mr. Peters is a 74 year old male with a diagnosis of metastatic prostate  cancer status post palliative RT the right pelvis (completed on 10/18/19) and more recently left ischium and left pubic symphysis (completed on 12/11/19).    PLAN:   1. Improvement in pain in treated left pelvis consistent with at least WI.     2. ? Right ischium pain, improves with activity, and tolerable. Will continue to monitor. Can consider palliative RT if worsens.     3. Continue systemic therapy with Dr. Catalan's team.     4. RTC as needed.       Eldon Bar M.D.  Department of Radiation Oncology  [facility]

## 2020-01-09 NOTE — LETTER
2020      RE: Duane A Gabrielson  [address] [address] [facility] [address]          Department of Radiation Oncology  Radiation Therapy Center  [facility] [place] [address]  [phone]       Radiation Oncology Follow-up Visit  2020      Duane A Gabrielson  MRN: [id number]   : 1945     DIAGNOSIS: Metastatic prostate cancer.                    STAGE: IV  INTENT OF RADIOTHERAPY: palliative XRT to      DISEASE TREATED:   Prostate cancer    RADIATION THERAPY DELIVERED:   -The patient had biochemical recurrence in  and underwent salvage prostate bed radiation therapy to a total dose of 70.2 Gy in 39 fractions, completed on 2005.      - 10/18/19: Completed palliative RT to R pelvis, 4 Gy x 5 fractions    - 19: Completed palliative RT to  Left pubic symphysis and left ischium        INTERVAL SINCE COMPLETION OF MOST RADIATION THERAPY:   1 month    SUBJECTIVE:   The patient returns for follow up.     He is overall doing well. He has noticed improvement in the left pubic symphysis and left ischium treated sites. Reduced overall pain medication. Able to sit more comfortably. Has noticed some increase in right ischium pain, but improves with activity and tolerable at this point.    PHYSICAL EXAM:  /74   Pulse 83   Resp 18   Wt 83.9 kg (185 lb)   SpO2 98%   Gen: Alert, in NAD  Eyes: PERRL, EOMI, sclera anicteric  Neck: Supple, full ROM, no LAD  Pulm: No wheezing, stridor or respiratory distress  CV: Well-perfused, no cyanosis, no pedal edema  Abdominal: Soft, nontender, nondistended, no hepatomegaly  Back: No step-offs or pain to palpation along the thoracolumbar spine, no CVA tenderness  Musculoskeletal: Improved left pubic symphasis and left ischium pain. ?  right ischium pain, improves with activity.  Skin: Normal color and turgor  Neurologic: A/Ox3, CN II-XII intact  Psychiatric: Appropriate mood and affect    LABS AND  IMAGING:  Reviewed.    IMPRESSION:   Mr. Peters is a 74 year old male with a diagnosis of metastatic prostate cancer status post palliative RT the right pelvis (completed on 10/18/19) and more recently left ischium and left pubic symphysis (completed on 12/11/19).    PLAN:   1. Improvement in pain in treated left pelvis consistent with at least NE.     2. ? Right ischium pain, improves with activity, and tolerable. Will continue to monitor. Can consider palliative RT if worsens.     3. Continue systemic therapy with Dr. Catalan's team.     4. RTC as needed.       Eldon Bar M.D.  Department of Radiation Oncology  [facility]           Eldon Bar MD

## 2020-01-09 NOTE — NURSING NOTE
"FOLLOW-UP VISIT    Patient Name: Duane A Gabrielson      : 1945     Age: 74 year old        ______________________________________________________________________________     Chief Complaint   Patient presents with     Radiation Therapy     follow up     /74   Pulse 83   Resp 18   Wt 83.9 kg (185 lb)   SpO2 98%      Date Radiation Completed: 19  Left pubic symphysis and ischium     10/18/19  R pelvis    Pain  Current history of pain associated with this visit:   Intensity: Patient is unable to quantify.  \" feels better since radiation\"  Current: dull and aching  Location: R buttock  Treatment: hx of B hip pain. Was taking 2 norco and 2 tramadol daily, now taking 1 norco and  1 tramadol daily    Meds  Current Med List Reviewed: Yes  Medication Note:     Labs  Other Labs: No    Imaging  None    Skin: Warm  Dry  Intact  Energy Level: low  --  Duane says\" I mostly lay on the couch all day, and I do sleep at night too. I sleep a lot\"    Other Appointments:     Date  MD Name: Dr. Nicole Cody  20     Other Notes:           "

## 2020-08-28 ENCOUNTER — TRANSFERRED RECORDS (OUTPATIENT)
Dept: HEALTH INFORMATION MANAGEMENT | Facility: CLINIC | Age: 75
End: 2020-08-28

## 2021-01-20 ENCOUNTER — TRANSCRIBE ORDERS (OUTPATIENT)
Dept: OTHER | Age: 76
End: 2021-01-20

## 2021-01-20 DIAGNOSIS — C61 PRIMARY MALIGNANT NEOPLASM OF PROSTATE METASTATIC TO BONE (H): ICD-10-CM

## 2021-01-20 DIAGNOSIS — C79.51 BONE METASTASIS: Primary | ICD-10-CM

## 2021-01-20 DIAGNOSIS — C79.51 PRIMARY MALIGNANT NEOPLASM OF PROSTATE METASTATIC TO BONE (H): ICD-10-CM

## 2021-01-20 DIAGNOSIS — C79.51 MALIGNANT NEOPLASM OF PROSTATE METASTATIC TO BONE (H): ICD-10-CM

## 2021-01-20 DIAGNOSIS — C61 MALIGNANT NEOPLASM OF PROSTATE METASTATIC TO BONE (H): ICD-10-CM

## 2021-01-21 NOTE — TELEPHONE ENCOUNTER
ONCOLOGY INTAKE: Records Information      APPT INFORMATION:  Referring provider:  Bethany Cody NP  Referring provider s clinic:  [facility]  Reason for visit/diagnosis:  Family history of malignant neoplasm of breast Bone metastasis (H) [C79.51]  Malignant neoplasm of prostate metastatic to bone (H)   Has patient been notified of appointment date and time?: Yes    RECORDS INFORMATION:  Were the records received with the referral (via [employer])? Yes    Has patient been seen for any external appt for this diagnosis? Yes    If yes, where? [facility]    Has patient had any imaging or procedures outside of [place] for this condition? Yes      If Yes, where? [facility]    ADDITIONAL INFORMATION:  Pt is scheduled in person

## 2021-01-22 NOTE — TELEPHONE ENCOUNTER
Action    Action Taken 21:    -LVM for pt re: recs call, notes mention procedures in ,  that are not in CE.     -Spoke w/ [facility] Rec (Sara) - they do not have older recs for pt. I was advised that they only retain records for 10 years unless pt is a long term, continuous patient.     -CE Mentions [facility]. [facility] does not participate in CE.    -Spoke w/ Ramandeep @ [facility] - they do have some records, and may even have original path report from , however this is not in their EMR & she will need to check for paper copies. Ramandeep advised me that she would fax over everything, but it may take a while. Ramandeep mentioned that there would be some PSAs, and notes from Urologist Dr. Jere Moy.    -Spoke w/ [facility] - was advised they only had a few images for pt in . I was advised they only retain imaging for 10 years, typically, and anything older than  would've been on film & has been purged.     -Faxed request for imaging they do have.   10:17 AM    21: Imaging resolved    -Recs from [facility] rec'd, sent to HIM for upload  7:45 AM       RECORDS STATUS - ALL OTHER DIAGNOSIS      RECORDS RECEIVED FROM: [facility]   DATE RECEIVED:    NOTES STATUS DETAILS   OFFICE NOTE from referring provider CELIA - Bethany Pardo NP   OFFICE NOTE from medical oncologist CELIA - [facility] Mosley: 21   DISCHARGE SUMMARY from hospital     DISCHARGE REPORT from the ER     OPERATIVE REPORT CELIA Olivo 21: Cystoscopy   MEDICATION LIST [facility]   CLINICAL TRIAL TREATMENTS TO DATE     LABS     PATHOLOGY REPORTS     ANYTHING RELATED TO DIAGNOSIS Epic/ 20 - 19: PSAs   GENONOMIC TESTING     TYPE:     IMAGING (NEED IMAGES & REPORT)     XR Requested 20: [facility], Report in CE ([facility])   NM Whole Bone Scan Requested 21, 10/16/20, 19, 19: [facility], Report in CE ([facility])   CT SCANS Requested 1/22 1/22/21, 10/2/20, 4/19/19: [facility], Report in [facility] ([facility])   MRI     MAMMO     ULTRASOUND     PET Requested 1/22 6/19/20: , Report in CE

## 2021-01-27 ENCOUNTER — PRE VISIT (OUTPATIENT)
Facility: CLINIC | Age: 76
End: 2021-01-27

## 2021-01-27 ENCOUNTER — ONCOLOGY VISIT (OUTPATIENT)
Dept: ONCOLOGY | Facility: CLINIC | Age: 76
End: 2021-01-27
Attending: NURSE PRACTITIONER
Payer: MEDICARE

## 2021-01-27 VITALS
OXYGEN SATURATION: 96 % | RESPIRATION RATE: 16 BRPM | DIASTOLIC BLOOD PRESSURE: 72 MMHG | BODY MASS INDEX: 29.73 KG/M2 | HEIGHT: 71 IN | HEART RATE: 98 BPM | WEIGHT: 212.4 LBS | TEMPERATURE: 97.9 F | SYSTOLIC BLOOD PRESSURE: 146 MMHG

## 2021-01-27 DIAGNOSIS — C79.51 MALIGNANT NEOPLASM OF PROSTATE METASTATIC TO BONE (H): ICD-10-CM

## 2021-01-27 DIAGNOSIS — C79.51 BONE METASTASIS: ICD-10-CM

## 2021-01-27 DIAGNOSIS — C61 MALIGNANT NEOPLASM OF PROSTATE METASTATIC TO BONE (H): ICD-10-CM

## 2021-01-27 LAB
ALBUMIN SERPL-MCNC: 3.5 G/DL (ref 3.4–5)
ALP SERPL-CCNC: 163 U/L (ref 40–150)
ALT SERPL W P-5'-P-CCNC: 20 U/L (ref 0–70)
ANION GAP SERPL CALCULATED.3IONS-SCNC: 7 MMOL/L (ref 3–14)
AST SERPL W P-5'-P-CCNC: 12 U/L (ref 0–45)
BASOPHILS # BLD AUTO: 0 10E9/L (ref 0–0.2)
BASOPHILS NFR BLD AUTO: 0.4 %
BILIRUB SERPL-MCNC: 0.3 MG/DL (ref 0.2–1.3)
BUN SERPL-MCNC: 18 MG/DL (ref 7–30)
CALCIUM SERPL-MCNC: 9 MG/DL (ref 8.5–10.1)
CHLORIDE SERPL-SCNC: 105 MMOL/L (ref 94–109)
CO2 SERPL-SCNC: 24 MMOL/L (ref 20–32)
CREAT SERPL-MCNC: 0.8 MG/DL (ref 0.66–1.25)
DIFFERENTIAL METHOD BLD: ABNORMAL
EOSINOPHIL # BLD AUTO: 0 10E9/L (ref 0–0.7)
EOSINOPHIL NFR BLD AUTO: 0 %
ERYTHROCYTE [DISTWIDTH] IN BLOOD BY AUTOMATED COUNT: 18.2 % (ref 10–15)
GFR SERPL CREATININE-BSD FRML MDRD: 87 ML/MIN/{1.73_M2}
GLUCOSE SERPL-MCNC: 104 MG/DL (ref 70–99)
HCT VFR BLD AUTO: 35.8 % (ref 40–53)
HGB BLD-MCNC: 11.5 G/DL (ref 13.3–17.7)
IMM GRANULOCYTES # BLD: 0 10E9/L (ref 0–0.4)
IMM GRANULOCYTES NFR BLD: 0.7 %
LYMPHOCYTES # BLD AUTO: 1 10E9/L (ref 0.8–5.3)
LYMPHOCYTES NFR BLD AUTO: 18.4 %
MCH RBC QN AUTO: 29.4 PG (ref 26.5–33)
MCHC RBC AUTO-ENTMCNC: 32.1 G/DL (ref 31.5–36.5)
MCV RBC AUTO: 92 FL (ref 78–100)
MONOCYTES # BLD AUTO: 0.6 10E9/L (ref 0–1.3)
MONOCYTES NFR BLD AUTO: 10.3 %
NEUTROPHILS # BLD AUTO: 4 10E9/L (ref 1.6–8.3)
NEUTROPHILS NFR BLD AUTO: 70.2 %
NRBC # BLD AUTO: 0 10*3/UL
NRBC BLD AUTO-RTO: 0 /100
PLATELET # BLD AUTO: 273 10E9/L (ref 150–450)
POTASSIUM SERPL-SCNC: 3.8 MMOL/L (ref 3.4–5.3)
PROT SERPL-MCNC: 7.8 G/DL (ref 6.8–8.8)
PSA SERPL-MCNC: 245 UG/L (ref 0–4)
RBC # BLD AUTO: 3.91 10E12/L (ref 4.4–5.9)
SODIUM SERPL-SCNC: 136 MMOL/L (ref 133–144)
WBC # BLD AUTO: 5.7 10E9/L (ref 4–11)

## 2021-01-27 PROCEDURE — 86316 IMMUNOASSAY TUMOR OTHER: CPT | Performed by: INTERNAL MEDICINE

## 2021-01-27 PROCEDURE — 84153 ASSAY OF PSA TOTAL: CPT | Performed by: INTERNAL MEDICINE

## 2021-01-27 PROCEDURE — 80053 COMPREHEN METABOLIC PANEL: CPT | Performed by: INTERNAL MEDICINE

## 2021-01-27 PROCEDURE — 85025 COMPLETE CBC W/AUTO DIFF WBC: CPT | Performed by: INTERNAL MEDICINE

## 2021-01-27 PROCEDURE — G0463 HOSPITAL OUTPT CLINIC VISIT: HCPCS

## 2021-01-27 PROCEDURE — 84403 ASSAY OF TOTAL TESTOSTERONE: CPT | Performed by: INTERNAL MEDICINE

## 2021-01-27 PROCEDURE — 99205 OFFICE O/P NEW HI 60 MIN: CPT | Performed by: INTERNAL MEDICINE

## 2021-01-27 ASSESSMENT — MIFFLIN-ST. JEOR: SCORE: 1720.57

## 2021-01-27 ASSESSMENT — PAIN SCALES - GENERAL: PAINLEVEL: NO PAIN (0)

## 2021-01-27 NOTE — NURSING NOTE
"Oncology Rooming Note    January 27, 2021 2:01 PM   Duane A Gabrielson is a 75 year old male who presents for:    Chief Complaint   Patient presents with     Oncology Clinic Visit     New pt- Malignant neoplasm of prostate metastatic to bone     Initial Vitals: BP (!) 146/72 (BP Location: Left arm, Patient Position: Sitting, Cuff Size: Adult Large)   Pulse 98   Temp 97.9  F (36.6  C) (Tympanic)   Resp 16   Ht 1.803 m (5' 11\")   Wt 96.3 kg (212 lb 6.4 oz)   SpO2 96%   BMI 29.62 kg/m   Estimated body mass index is 29.62 kg/m  as calculated from the following:    Height as of this encounter: 1.803 m (5' 11\").    Weight as of this encounter: 96.3 kg (212 lb 6.4 oz). Body surface area is 2.2 meters squared.  No Pain (0) Comment: Data Unavailable   No LMP for male patient.  Allergies reviewed: Yes  Medications reviewed: Yes    Medications: Medication refills not needed today.  Pharmacy name entered into EPIC: Data Unavailable    Clinical concerns: N/A       Sera Mckee CMA              "

## 2021-01-27 NOTE — PROGRESS NOTES
MEDICAL ONCOLOGY NEW PATIENT CLINIC NOTE      DATE OF SERVICE: 01/27/2021      REFERRING PHYSICIAN:  Allison Mosley MD, [facility], [address], [place] [address], fax [phone].      REASON FOR CONSULTATION:  Evaluation for radium-223 treatment for metastatic castration-resistant prostate cancer.      HISTORY OF PRESENT ILLNESS:  Mr. Duane Gabrielson is a delightful 75-year-old gentleman who presents with his sister for initial consultation for metastatic castration-resistant prostate cancer.  Oncologic history below.      Duane was most recently on docetaxel treatment and has no residual side effects from therapy.  He is feeling fairly stable overall and has minimal fatigue and no clinical signs or symptoms of disease progression.      ONCOLOGIC HISTORY:   1.  Prostate cancer.   -- 2004.  Patient was diagnosed with localized prostate cancer and underwent radical prostatectomy.  Pathology showed Dunning 3+4 = 7 adenocarcinoma with perineural invasion.  He had T3 disease.   -- 2005.  For biochemical recurrence, he underwent salvage radiation therapy to prostate bed with a total of 70.2 Gy in 39 fractions, completed on 12/30/2005.   -- 2009.  Biochemical recurrence and started on intermittent ADT with leuprolide.   -- 04/2019.  CT chest, abdomen and pelvis with contrast with extensive sclerotic bony metastases and multiple tiny pulmonary nodules as well as nuclear medicine bone scan with extensive bone metastases in bilateral appendicular and axial skeleton, including in the entire spine.   -- 05/2019.  PSA was 169.  The patient started bicalutamide.   -- 09/2019.  CT chest, abdomen and pelvis with contrast and nuclear medicine bone scan showed disease progression and the PSA was elevated at 212.   -- 10/18/2019.  Completed palliative radiation to the right pelvis at 4 Gy per fraction for a total of 5 fractions with Dr. Eldon Bar.   -- 10/2019.  Started abiraterone and  prednisone with initial decline in PSA to a marielena of 26 by 02/2020.  However, PSA eventually increased to 57 by 06/2020.   -- 12/11/2019.  Completed palliative radiation to left pubic symphysis and left ischium 20 Gy total, under Dr. Eldon Bar's care.   -- 06/2020.  PET CT scan with numerous osseous metastases throughout the axial and proximal appendicular skeleton.   -- 07/2020.  Started enzalutamide 240 mg daily (due to drug-drug interaction with carbamazepine).  By 08/2020, his dose was reduced to 200 mg daily due to dizziness/headache.  His PSA progressed from 63 to 108 on therapy.  In 10/2020, a CT chest, abdomen and pelvis as well as nuclear medicine bone scan showed stable extensive osseous metastases.   -- 10/2020.  Decision made to switch him to docetaxel, which was started on 10/28/2020, and the patient completed 3 cycles.  His PSA trended up to 205 by cycle 3, day 1, and he was referred to me for radium-223 treatment.      REVIEW OF SYSTEMS:  A 14-point review of system negative except for as above.      PAST MEDICAL HISTORY:   1.  Prostate cancer as above.   2.  Anemia due to vitamin B12 deficiency.   3.  History of seizure disorder/epilepsy diagnosed in the 1980s and has been on a stable dose of carbamazepine since then.  Does not follow with a neurologist and no records available.   4.  History of recurrent UTIs with history of ESBL Proteus mirabilis, followed by Urology and on chronic suppressive Bactrim.      PAST SURGICAL HISTORY:    As above.      FAMILY HISTORY:   No clustering of malignancies in the family.      SOCIAL HISTORY:   The patient is a former smoker and quit in 1967.  He never used alcohol and denies illicit drug use.      ALLERGIES:   No Known Allergies     MEDICATIONS:    Current Outpatient Medications:      aspirin 81 MG EC tablet, Take 81 mg by mouth daily, Disp: , Rfl:      carBAMazepine (CARBATROL) 300 MG 12 hr capsule, Take 300 mg by mouth 2 times daily, Disp: , Rfl:       "carboxymethylcellul-glycerin (OPTIVE) 0.5-0.9 % SOLN ophthalmic solution, Apply to eye daily, Disp: , Rfl:      Cholecalciferol (VITAMIN D3) 1000 units CAPS, Take 1,000 Units by mouth daily, Disp: , Rfl:      cyanocobalamin (CYANOCOBALAMIN) 1000 MCG/ML injection, Inject 1,000 mcg into the muscle every 28 days, Disp: , Rfl:      ibuprofen (ADVIL/MOTRIN) 200 MG tablet, Take 200 mg by mouth every 4 hours as needed for moderate pain Taking one tab 2-3 times per day, Disp: , Rfl:      leuprolide, 4 Month, (ELIGARD) 30 MG, Inject 30 mg Subcutaneous every 4 months, Disp: , Rfl:      levothyroxine (SYNTHROID/LEVOTHROID) 112 MCG tablet, Take 112 mcg by mouth daily, Disp: , Rfl:      lisinopril-hydrochlorothiazide (PRINZIDE/ZESTORETIC) 10-12.5 MG tablet, Take 1 tablet by mouth daily, Disp: , Rfl:      Omega-3 Fatty Acids (FISH OIL PO), Take 500 mg by mouth daily, Disp: , Rfl:      omeprazole (PRILOSEC) 20 MG DR capsule, Take 20 mg by mouth daily, Disp: , Rfl:      SHINGRIX injection, Inject 1 ml intramuscularly single dose, Disp: , Rfl: 1     simvastatin (ZOCOR) 20 MG tablet, Take 20 mg by mouth daily, Disp: , Rfl:      vitamin C (ASCORBIC ACID) 500 MG tablet, Take 500 mg by mouth daily, Disp: , Rfl:      zoledronic acid (ZOMETA) 4 MG/100ML SOLN, 4 mg, Disp: , Rfl:      abiraterone (ZYTIGA) 500 MG TABS tablet, Take 1,000 mg by mouth daily, Disp: , Rfl:      HYDROcodone-acetaminophen (NORCO) 5-325 MG tablet, Take 0.5 tablets by mouth every 4 hours as needed 0.5 to 2 tabs every 4 hours as needed for pain, Disp: , Rfl:      predniSONE (DELTASONE) 10 MG tablet, Take 10 mg by mouth daily, Disp: , Rfl:      traMADol (ULTRAM) 50 MG tablet, Take 1 tablet by mouth 2 times daily as needed, Disp: , Rfl:      PHYSICAL EXAMINATION:   VITAL SIGNS:  BP (!) 146/72 (BP Location: Left arm, Patient Position: Sitting, Cuff Size: Adult Large)   Pulse 98   Temp 97.9  F (36.6  C) (Tympanic)   Resp 16   Ht 1.803 m (5' 11\")   Wt 96.3 kg (212 lb " 6.4 oz)   SpO2 96%   BMI 29.62 kg/m    GENERAL:  Elderly, well-nourished gentleman in a chair, in no acute distress.   HEENT:  Pupils equal, reactive to light and accommodation.  Extraocular movements intact.  Mucous membranes moist.   NECK:  No JVD or lymphadenopathy.   CHEST:  Good air entry bilaterally, normal work of breathing.   CARDIOVASCULAR:  S1, S2.  No murmurs, rubs or gallops.   ABDOMEN:  Soft, nontender, nondistended.  Bowel sounds present.   MUSCULOSKELETAL:  Range of motion in all extremities.  No clubbing, cyanosis or edema.   NEUROLOGIC:  Cranial nerves were grossly intact.  No focal deficits.   PSYCHIATRIC:  Mood and affect normal.      LABORATORY DATA:     Recent Labs   Lab Test 01/27/21  1504   WBC 5.7   RBC 3.91*   HGB 11.5*   HCT 35.8*   MCV 92   MCH 29.4   MCHC 32.1   RDW 18.2*      NEUTROPHIL 70.2      POTASSIUM 3.8   CHLORIDE 105   CO2 24   ANIONGAP 7   *   BUN 18   CR 0.80   SALVATORE 9.0   PROTTOTAL 7.8   ALBUMIN 3.5   BILITOTAL 0.3   ALKPHOS 163*   AST 12   ALT 20     Recent Labs   Lab Test 01/27/21  1504   .00*   TESTOSTTOTAL 8*   CGAB - Chromogranin A; TESTOSTTOTAL: Total testosterone.     RADIOGRAPHIC AND PATHOLOGY DATA:    1. As above.    2. In addition, the patient had CT chest, abdomen and pelvis and bone scan on 01/22/2021, which compared with 10/2020.  Did not have any new evidence of metastatic disease.  He had extensive osteoblastic bony metastases, which are similar to prior with no new metastases.  He also has a large bladder diverticulum arising off of the distal urethra, which also connects with the prostatic urethra and contained several stones.  This diverticula has increased in size compared to prior exam and now measures 6 x 4.2 x 4.1 cm.  There was also enhancement and subtle inflammatory change of the left ureter, suggesting urethritis with kidneys within normal limits and there are multiple stable ovoid enhancing lesions along the base of the  penile shaft, which are likely benign and appear similar to prior exam.  There is no visceral metastasis noted on this set of scans.      ASSESSMENT AND PLAN:  A 75-year-old gentleman with metastatic, castration-resistant prostate adenocarcinoma who is here for discussion of treatment options.      1.  Prostate cancer.   - I reviewed the available diagnostic data at length with the patient and family and explained that he does appear to have bone predominant/exclusive metastatic CRPC.   - In terms of whether he has truly progressed on docetaxel, while he certainly has had ongoing elevation of PSAs, he does not have any clinical or radiographic evidence of disease progression at this time. Early PSA rise on treatment doesn't always correlate with progression.   - I discussed that his options include continuing with docetaxel chemotherapy for a few more cycles versus switching to another agent such as cabazitaxel or radium-223.  I do not have a clinical trial available for him at this time. The patient and family unequivocally want to proceed with switching treatment to radium-223.   - Reviewed the regimen which would be based on the ALSPCA trial of radium injections every 4 weeks for a total of 6 cycles.  Reviewed side effects including fatigue, nausea, vomiting, diarrhea, constipation, myelosuppression with increased risk of infection, bleeding, anemia, increased risk of second malignancies, etc., in great detail.     - The patient wants to get started ASAP, but because of his impending bladder diverticulum procedure, we will plan to start him on this treatment in about 2-4 weeks from now. Discontinue docetaxel and prednisone effective now.   - Scans after cycle 3 or cycle 6 depending on how he is tolerating the treatment.  I did explain to the patient that PSA increases alone should not be a reason to pursue subsequent treatment switches, as radium-223 can have late are no PSA declines and still result in  meaningful survival advantage.      2.  Bone metastases.   - The patient will continue with ADT and bone antiresorptive therapies with his primary oncologist.  This is even more important given the risk of fractures and SREs from radium-223 use.       3.  Bladder diverticulum.   - Management of stone and diverticulum per Urology team locally.    - Will not start radium until this issue is resolved as a small % of radium gets excreted through the urinary system, and bladder obstruction could increase any side effects.      Return to see me concurrently with cycle 2 of [employer] (radium-223).  The exact followup plan will be set by my nurse coordinator, Claudia Murry, after discussion with nuclear medicine.      I appreciate Dr. Mosley's referral to the clinic and will send her this note for review.      The patient and family were given the opportunity to ask questions, which were answered to their satisfaction.  They are in complete agreement with this plan.      BILLIN      Jacek Olmedo M.D.  . Professor of Medicine  Genitourinary Oncology  Division of Hematology, Oncology & Transplantation  [facility]       cc:   Allison Mosley MD   [facility]   [address] [place] [address]  [phone]      Ruthy Christian CMA  [facility]   [address] [place] [address]  [phone]

## 2021-01-28 ENCOUNTER — PATIENT OUTREACH (OUTPATIENT)
Dept: ONCOLOGY | Facility: CLINIC | Age: 76
End: 2021-01-28

## 2021-01-28 ENCOUNTER — TELEPHONE (OUTPATIENT)
Dept: ONCOLOGY | Facility: CLINIC | Age: 76
End: 2021-01-28

## 2021-01-28 NOTE — PROGRESS NOTES
Met with Duane A Gabrielson and his sister, Norma, after clinic visit/consultation appt with Dr. Olmedo.     We reviewed Dr. Olmedo's plan of care: [employer].  Household includes: Self.    Barriers to care identified: None.     Introduced self and role of RN Care Coordinator at [facility]. Provided my contact information, [facility] phone number (which has options to talk with a Nurse available 24/7 - triage and RNCC via this option during business hours).     Reviewed [employer] Patient Education. Plan to e-mail additional information on when to call our triage team.      Reviewed appropriate use of MyChart, not to be used for symptom reporting.      Learning assessment: Completed     Answered questions regarding: None at this time     Duane was referred by his primary oncologist, Dr. Mosley, for Xofigo treatments. Reviewed schedule for Xofigo and plan for Duane to get labs and weight locally in [place] one week prior to each Xofigo treatment.     Xo[employer] application signed and faxed to [employer] for benefits verification. Awaiting approval. Tentative start date is 2/17.     Encouraged Duane and Norma to call with any additional questions or concerns.       ESE Howard, RN  RN Care Coordinator  [facility]

## 2021-01-28 NOTE — TELEPHONE ENCOUNTER
RN CARE COORDINATION      Date Received in Clinic: n/a  Name/Type of Form: [employer] Application  Date Completed: 1/28/2021  Copy Mailed to patient: No  Disposition of Form: Faxed to [employer] @ [phone]      ESE Howard, RN  RN Care Coordinator  [facility]

## 2021-01-29 LAB
CGA SERPL-MCNC: 57 NG/ML (ref 0–103)
TESTOST SERPL-MCNC: 8 NG/DL (ref 240–950)

## 2021-02-10 ENCOUNTER — TELEPHONE (OUTPATIENT)
Dept: ONCOLOGY | Facility: CLINIC | Age: 76
End: 2021-02-10

## 2021-02-10 NOTE — TELEPHONE ENCOUNTER
RN CARE COORDINATION      Date Received in Clinic: n/a  Name/Type of Form: lab order  Date Completed: 2/10/2021  Copy Mailed to patient: no  Disposition of Form: Faxed to Jermaine Rankin @ [phone]    Plan for CBC and WEIGHT one week prior to each Xofigo treatment starting on 2/22/2021.      ELANA HowardN, RN  RN Care Coordinator  [facility]

## 2021-02-20 NOTE — PROGRESS NOTES
February 22, 2021     Reason for Visit: follow up metastatic castration-resistant prostate cancer    Oncology HPI:   1.  Prostate cancer.   -- 2004.  Patient was diagnosed with localized prostate cancer and underwent radical prostatectomy.  Pathology showed Eligio 3+4 = 7 adenocarcinoma with perineural invasion.  He had T3 disease.   -- 2005.  For biochemical recurrence, he underwent salvage radiation therapy to prostate bed with a total of 70.2 Gy in 39 fractions, completed on 12/30/2005.   -- 2009.  Biochemical recurrence and started on intermittent ADT with leuprolide.   -- 04/2019.  CT chest, abdomen and pelvis with contrast with extensive sclerotic bony metastases and multiple tiny pulmonary nodules as well as nuclear medicine bone scan with extensive bone metastases in bilateral appendicular and axial skeleton, including in the entire spine.   -- 05/2019.  PSA was 169.  The patient started bicalutamide.   -- 09/2019.  CT chest, abdomen and pelvis with contrast and nuclear medicine bone scan showed disease progression and the PSA was elevated at 212.   -- 10/18/2019.  Completed palliative radiation to the right pelvis at 4 Gy per fraction for a total of 5 fractions with Dr. Eldon Bar.   -- 10/2019.  Started abiraterone and prednisone with initial decline in PSA to a marielena of 26 by 02/2020.  However, PSA eventually increased to 57 by 06/2020.   -- 12/11/2019.  Completed palliative radiation to left pubic symphysis and left ischium 20 Gy total, under Dr. Eldon Bar's care.   -- 06/2020.  PET CT scan with numerous osseous metastases throughout the axial and proximal appendicular skeleton.   -- 07/2020.  Started enzalutamide 240 mg daily (due to drug-drug interaction with carbamazepine).  By 08/2020, his dose was reduced to 200 mg daily due to dizziness/headache.  His PSA progressed from 63 to 108 on therapy.  In 10/2020, a CT chest, abdomen and pelvis as well as nuclear medicine bone scan showed stable extensive  osseous metastases.   -- 10/2020.  Decision made to switch him to docetaxel, which was started on 10/28/2020, and the patient completed 3 cycles.  His PSA trended up to 205 by cycle 3, day 1, and he was referred to Dr. Olmedo for radium-223 treatment.   -- 01/22/2021: Did not have any new evidence of metastatic disease.  He had extensive osteoblastic bony metastases, which are similar to prior with no new metastases.  He also has a large bladder diverticulum arising off of the distal urethra, which also connects with the prostatic urethra and contained several stones.  This diverticula has increased in size compared to prior exam and now measures 6 x 4.2 x 4.1 cm.  There was also enhancement and subtle inflammatory change of the left ureter, suggesting urethritis with kidneys within normal limits and there are multiple stable ovoid enhancing lesions along the base of the penile shaft, which are likely benign and appear similar to prior exam.  There is no visceral metastasis noted on this set of scans.    Interval history:   Duane has no major changes since meeting with Dr. Olmedo, except for that he has completed his procedure and post op care with Urology. Bolden was removed last week and he is urinating. Energy levels and appetite remain stable. He naps once per day but lives independently. Has no bone pains, or any pain at all. Has recently noticed some intermittent tingling of his left foot in the evening that resolves when he puts a sock on. No sob/cp/cough/f/c. Bowels occasionally constipated for which he takes a laxative prn.    10 point review of systems otherwise negative     Current Outpatient Medications   Medication Sig Dispense Refill     abiraterone (ZYTIGA) 500 MG TABS tablet Take 1,000 mg by mouth daily       aspirin 81 MG EC tablet Take 81 mg by mouth daily       carBAMazepine (CARBATROL) 300 MG 12 hr capsule Take 300 mg by mouth 2 times daily       carboxymethylcellul-glycerin (OPTIVE) 0.5-0.9 % SOLN  ophthalmic solution Apply to eye daily       Cholecalciferol (VITAMIN D3) 1000 units CAPS Take 1,000 Units by mouth daily       cyanocobalamin (CYANOCOBALAMIN) 1000 MCG/ML injection Inject 1,000 mcg into the muscle every 28 days       HYDROcodone-acetaminophen (NORCO) 5-325 MG tablet Take 0.5 tablets by mouth every 4 hours as needed 0.5 to 2 tabs every 4 hours as needed for pain       ibuprofen (ADVIL/MOTRIN) 200 MG tablet Take 200 mg by mouth every 4 hours as needed for moderate pain Taking one tab 2-3 times per day       leuprolide, 4 Month, (ELIGARD) 30 MG Inject 30 mg Subcutaneous every 4 months       levothyroxine (SYNTHROID/LEVOTHROID) 112 MCG tablet Take 112 mcg by mouth daily       lisinopril-hydrochlorothiazide (PRINZIDE/ZESTORETIC) 10-12.5 MG tablet Take 1 tablet by mouth daily       Omega-3 Fatty Acids (FISH OIL PO) Take 500 mg by mouth daily       omeprazole (PRILOSEC) 20 MG DR capsule Take 20 mg by mouth daily       predniSONE (DELTASONE) 10 MG tablet Take 10 mg by mouth daily       SHINGRIX injection Inject 1 ml intramuscularly single dose  1     simvastatin (ZOCOR) 20 MG tablet Take 20 mg by mouth daily       traMADol (ULTRAM) 50 MG tablet Take 1 tablet by mouth 2 times daily as needed       vitamin C (ASCORBIC ACID) 500 MG tablet Take 500 mg by mouth daily       zoledronic acid (ZOMETA) 4 MG/100ML SOLN 4 mg          No Known Allergies      Exam:  Blood pressure 135/65, pulse 99, temperature 98  F (36.7  C), temperature source Oral, weight 94.6 kg (208 lb 8 oz), SpO2 98 %.  Wt Readings from Last 4 Encounters:   01/27/21 96.3 kg (212 lb 6.4 oz)   01/09/20 83.9 kg (185 lb)   12/11/19 83 kg (183 lb)   12/04/19 82.7 kg (182 lb 6.4 oz)     General: No acute distress  HEENT: Sclera anicteric. Oral mucosa pink and moist.  No mucositis or thrush  Lymph: No lymphadenopathy in neck  Heart: Regular, rate, and rhythm  Lungs: Clear to ascultation bilaterally  Abdomen: Positive bowel sounds. Soft, non-distended,  non-tender. No organomegaly or mass.   Extremities: no lower extremity edema  Neuro: Cranial nerves grossly intact  Rash: none  Vascular access: port    Labs: reviewed in care everywhere 2/15/21. PSA is 269 and alk phos 261.     Imaging: n/a    Impression/plan:   1.  Prostate cancer.   - Previously reviewed the available diagnostic data at length with the patient and family and explained that he does appear to have bone predominant/exclusive metastatic CRPC.   - In terms of whether he has truly progressed on docetaxel, while he certainly has had ongoing elevation of PSAs, he does not have any clinical or radiographic evidence of disease progression at this time. Early PSA rise on treatment doesn't always correlate with progression. Options at this time included continuing with docetaxel chemotherapy for a few more cycles versus switching to another agent such as cabazitaxel or radium-223. Family and patient elected to pursue Xofigo. His labs are adequate to start treatment today and his local urology team has completed his diverticulum procedure and follow up.   - discussed potential side effects including fatigue, nausea, vomiting, diarrhea, constipation, myelosuppression with increased risk of infection, bleeding, anemia  -follow up alternating with myself and Dr. Pineda moving forward   - Scans after cycle 3 or cycle 6 depending on how he is tolerating the treatment.  I did explain to the patient that PSA increases alone should not be a reason to pursue subsequent treatment switches, as radium-223 can have late are no PSA declines and still result in meaningful survival advantage    2.  Bone metastases.   - The patient will continue with ADT and bone antiresorptive therapies with his primary oncologist.  This is even more important given the risk of fractures and SREs from radium-223 use. up to date with lupron and zomenta given 2/19.     3.  Bladder diverticulum.   - s/p cysto with bladder stone removal locally on  2/4/21. Had appropriate follow up 2/17 with cystogram showing no leaks.   - Delayed start of radium until this issue was resolved as a small % of radium gets excreted through the urinary system, and bladder obstruction could increase any side effects. Proceed as above since this is resolved.       40 minutes spent on the date of the encounter doing chart review, review of test results, interpretation of tests, patient visit, documentation and discussion with family     Meena Dowell CNP on 2/22/2021 at 11:03 AM

## 2021-02-22 ENCOUNTER — HOSPITAL ENCOUNTER (OUTPATIENT)
Dept: NUCLEAR MEDICINE | Facility: CLINIC | Age: 76
Setting detail: NUCLEAR MEDICINE
End: 2021-02-22
Attending: INTERNAL MEDICINE
Payer: MEDICARE

## 2021-02-22 ENCOUNTER — ONCOLOGY VISIT (OUTPATIENT)
Dept: ONCOLOGY | Facility: CLINIC | Age: 76
End: 2021-02-22
Attending: NURSE PRACTITIONER
Payer: MEDICARE

## 2021-02-22 VITALS
HEART RATE: 99 BPM | TEMPERATURE: 98 F | WEIGHT: 208.5 LBS | DIASTOLIC BLOOD PRESSURE: 65 MMHG | OXYGEN SATURATION: 98 % | SYSTOLIC BLOOD PRESSURE: 135 MMHG | BODY MASS INDEX: 29.08 KG/M2

## 2021-02-22 DIAGNOSIS — N21.0 BLADDER STONES: ICD-10-CM

## 2021-02-22 DIAGNOSIS — C79.51 MALIGNANT NEOPLASM OF PROSTATE METASTATIC TO BONE (H): ICD-10-CM

## 2021-02-22 DIAGNOSIS — C79.51 MALIGNANT NEOPLASM OF PROSTATE METASTATIC TO BONE (H): Primary | ICD-10-CM

## 2021-02-22 DIAGNOSIS — C79.51 BONE METASTASIS: ICD-10-CM

## 2021-02-22 DIAGNOSIS — C61 MALIGNANT NEOPLASM OF PROSTATE METASTATIC TO BONE (H): ICD-10-CM

## 2021-02-22 DIAGNOSIS — C61 MALIGNANT NEOPLASM OF PROSTATE METASTATIC TO BONE (H): Primary | ICD-10-CM

## 2021-02-22 PROCEDURE — 79101 NUCLEAR RX IV ADMIN: CPT | Mod: 26 | Performed by: RADIOLOGY

## 2021-02-22 PROCEDURE — 99215 OFFICE O/P EST HI 40 MIN: CPT | Mod: 25 | Performed by: NURSE PRACTITIONER

## 2021-02-22 PROCEDURE — G1004 CDSM NDSC: HCPCS | Mod: GC | Performed by: RADIOLOGY

## 2021-02-22 PROCEDURE — G1004 CDSM NDSC: HCPCS

## 2021-02-22 PROCEDURE — G0463 HOSPITAL OUTPT CLINIC VISIT: HCPCS | Mod: 25

## 2021-02-22 PROCEDURE — 344N000001 HC RX 344: Performed by: INTERNAL MEDICINE

## 2021-02-22 PROCEDURE — A9606 RADIUM RA223 DICHLORIDE THER: HCPCS | Performed by: INTERNAL MEDICINE

## 2021-02-22 RX ADMIN — RADIUM RA 223 DICHLORIDE 153.6 UCI.: 30 INJECTION INTRAVENOUS at 12:22

## 2021-02-22 ASSESSMENT — PAIN SCALES - GENERAL: PAINLEVEL: NO PAIN (0)

## 2021-02-22 NOTE — NURSING NOTE
"Oncology Rooming Note    February 22, 2021 10:30 AM   Duane A Gabrielson is a 75 year old male who presents for:    Chief Complaint   Patient presents with     Oncology Clinic Visit     PROSTATE CANCER      Initial Vitals: /65   Pulse 99   Temp 98  F (36.7  C) (Oral)   Wt 94.6 kg (208 lb 8 oz)   SpO2 98%   BMI 29.08 kg/m   Estimated body mass index is 29.08 kg/m  as calculated from the following:    Height as of 1/27/21: 1.803 m (5' 11\").    Weight as of this encounter: 94.6 kg (208 lb 8 oz). Body surface area is 2.18 meters squared.  No Pain (0) Comment: Data Unavailable   No LMP for male patient.  Allergies reviewed: Yes  Medications reviewed: Yes    Medications: Medication refills not needed today.  Pharmacy name entered into EPIC: Data Unavailable    Clinical concerns: NONE       Neda Dye CMA              "

## 2021-02-22 NOTE — LETTER
2/22/2021         RE: Duane A Gabrielson  [id number] [facility] [id number]        Dear Colleague,    Thank you for referring your patient, Duane A Gabrielson, to the [facility]. Please see a copy of my visit note below.    February 22, 2021     Reason for Visit: follow up metastatic castration-resistant prostate cancer    Oncology HPI:   1.  Prostate cancer.   -- 2004.  Patient was diagnosed with localized prostate cancer and underwent radical prostatectomy.  Pathology showed [place] 3+4 = 7 adenocarcinoma with perineural invasion.  He had T3 disease.   -- 2005.  For biochemical recurrence, he underwent salvage radiation therapy to prostate bed with a total of 70.2 Gy in 39 fractions, completed on 12/30/2005.   -- 2009.  Biochemical recurrence and started on intermittent ADT with leuprolide.   -- 04/2019.  CT chest, abdomen and pelvis with contrast with extensive sclerotic bony metastases and multiple tiny pulmonary nodules as well as nuclear medicine bone scan with extensive bone metastases in bilateral appendicular and axial skeleton, including in the entire spine.   -- 05/2019.  PSA was 169.  The patient started bicalutamide.   -- 09/2019.  CT chest, abdomen and pelvis with contrast and nuclear medicine bone scan showed disease progression and the PSA was elevated at 212.   -- 10/18/2019.  Completed palliative radiation to the right pelvis at 4 Gy per fraction for a total of 5 fractions with Dr. Eldon Bar.   -- 10/2019.  Started abiraterone and prednisone with initial decline in PSA to a marielena of 26 by 02/2020.  However, PSA eventually increased to 57 by 06/2020.   -- 12/11/2019.  Completed palliative radiation to left pubic symphysis and left ischium 20 Gy total, under Dr. Eldon Bar's care.   -- 06/2020.  PET CT scan with numerous osseous metastases throughout the axial and proximal appendicular skeleton.   -- 07/2020.  Started enzalutamide 240 mg daily (due to  drug-drug interaction with carbamazepine).  By 08/2020, his dose was reduced to 200 mg daily due to dizziness/headache.  His PSA progressed from 63 to 108 on therapy.  In 10/2020, a CT chest, abdomen and pelvis as well as nuclear medicine bone scan showed stable extensive osseous metastases.   -- 10/2020.  Decision made to switch him to docetaxel, which was started on 10/28/2020, and the patient completed 3 cycles.  His PSA trended up to 205 by cycle 3, day 1, and he was referred to Dr. Olmedo for radium-223 treatment.   -- 01/22/2021: Did not have any new evidence of metastatic disease.  He had extensive osteoblastic bony metastases, which are similar to prior with no new metastases.  He also has a large bladder diverticulum arising off of the distal urethra, which also connects with the prostatic urethra and contained several stones.  This diverticula has increased in size compared to prior exam and now measures 6 x 4.2 x 4.1 cm.  There was also enhancement and subtle inflammatory change of the left ureter, suggesting urethritis with kidneys within normal limits and there are multiple stable ovoid enhancing lesions along the base of the penile shaft, which are likely benign and appear similar to prior exam.  There is no visceral metastasis noted on this set of scans.    Interval history:   Duane has no major changes since meeting with Dr. Olmedo, except for that he has completed his procedure and post op care with Urology. Bolden was removed last week and he is urinating. Energy levels and appetite remain stable. He naps once per day but lives independently. Has no bone pains, or any pain at all. Has recently noticed some intermittent tingling of his left foot in the evening that resolves when he puts a sock on. No sob/cp/cough/f/c. Bowels occasionally constipated for which he takes a laxative prn.    10 point review of systems otherwise negative     Current Outpatient Medications   Medication Sig Dispense Refill      abiraterone (ZYTIGA) 500 MG TABS tablet Take 1,000 mg by mouth daily       aspirin 81 MG EC tablet Take 81 mg by mouth daily       carBAMazepine (CARBATROL) 300 MG 12 hr capsule Take 300 mg by mouth 2 times daily       carboxymethylcellul-glycerin (OPTIVE) 0.5-0.9 % SOLN ophthalmic solution Apply to eye daily       Cholecalciferol (VITAMIN D3) 1000 units CAPS Take 1,000 Units by mouth daily       cyanocobalamin (CYANOCOBALAMIN) 1000 MCG/ML injection Inject 1,000 mcg into the muscle every 28 days       HYDROcodone-acetaminophen (NORCO) 5-325 MG tablet Take 0.5 tablets by mouth every 4 hours as needed 0.5 to 2 tabs every 4 hours as needed for pain       ibuprofen (ADVIL/MOTRIN) 200 MG tablet Take 200 mg by mouth every 4 hours as needed for moderate pain Taking one tab 2-3 times per day       leuprolide, 4 Month, (ELIGARD) 30 MG Inject 30 mg Subcutaneous every 4 months       levothyroxine (SYNTHROID/LEVOTHROID) 112 MCG tablet Take 112 mcg by mouth daily       lisinopril-hydrochlorothiazide (PRINZIDE/ZESTORETIC) 10-12.5 MG tablet Take 1 tablet by mouth daily       Omega-3 Fatty Acids (FISH OIL PO) Take 500 mg by mouth daily       omeprazole (PRILOSEC) 20 MG DR capsule Take 20 mg by mouth daily       predniSONE (DELTASONE) 10 MG tablet Take 10 mg by mouth daily       SHINGRIX injection Inject 1 ml intramuscularly single dose  1     simvastatin (ZOCOR) 20 MG tablet Take 20 mg by mouth daily       traMADol (ULTRAM) 50 MG tablet Take 1 tablet by mouth 2 times daily as needed       vitamin C (ASCORBIC ACID) 500 MG tablet Take 500 mg by mouth daily       zoledronic acid (ZOMETA) 4 MG/100ML SOLN 4 mg          No Known Allergies      Exam:  Blood pressure 135/65, pulse 99, temperature 98  F (36.7  C), temperature source Oral, weight 94.6 kg (208 lb 8 oz), SpO2 98 %.  Wt Readings from Last 4 Encounters:   01/27/21 96.3 kg (212 lb 6.4 oz)   01/09/20 83.9 kg (185 lb)   12/11/19 83 kg (183 lb)   12/04/19 82.7 kg (182 lb 6.4 oz)      General: No acute distress  HEENT: Sclera anicteric. Oral mucosa pink and moist.  No mucositis or thrush  Lymph: No lymphadenopathy in neck  Heart: Regular, rate, and rhythm  Lungs: Clear to ascultation bilaterally  Abdomen: Positive bowel sounds. Soft, non-distended, non-tender. No organomegaly or mass.   Extremities: no lower extremity edema  Neuro: Cranial nerves grossly intact  Rash: none  Vascular access: port    Labs: reviewed in care everywhere 2/15/21. PSA is 269 and alk phos 261.     Imaging: n/a    Impression/plan:   1.  Prostate cancer.   - Previously reviewed the available diagnostic data at length with the patient and family and explained that he does appear to have bone predominant/exclusive metastatic CRPC.   - In terms of whether he has truly progressed on docetaxel, while he certainly has had ongoing elevation of PSAs, he does not have any clinical or radiographic evidence of disease progression at this time. Early PSA rise on treatment doesn't always correlate with progression. Options at this time included continuing with docetaxel chemotherapy for a few more cycles versus switching to another agent such as cabazitaxel or radium-223. Family and patient elected to pursue Xofigo. His labs are adequate to start treatment today and his local urology team has completed his diverticulum procedure and follow up.   - discussed potential side effects including fatigue, nausea, vomiting, diarrhea, constipation, myelosuppression with increased risk of infection, bleeding, anemia  -follow up alternating with myself and Dr. Pineda moving forward   - Scans after cycle 3 or cycle 6 depending on how he is tolerating the treatment.  I did explain to the patient that PSA increases alone should not be a reason to pursue subsequent treatment switches, as radium-223 can have late are no PSA declines and still result in meaningful survival advantage    2.  Bone metastases.   - The patient will continue with ADT and  bone antiresorptive therapies with his primary oncologist.  This is even more important given the risk of fractures and SREs from radium-223 use. up to date with lupron and zomenta given 2/19.     3.  Bladder diverticulum.   - s/p cysto with bladder stone removal locally on 2/4/21. Had appropriate follow up 2/17 with cystogram showing no leaks.   - Delayed start of radium until this issue was resolved as a small % of radium gets excreted through the urinary system, and bladder obstruction could increase any side effects. Proceed as above since this is resolved.       40 minutes spent on the date of the encounter doing chart review, review of test results, interpretation of tests, patient visit, documentation and discussion with family     Meena Dowell CNP on 2/22/2021 at 11:03 AM           Again, thank you for allowing me to participate in the care of your patient.        Sincerely,        Meena Dowell CNP

## 2021-03-20 NOTE — PROGRESS NOTES
MEDICAL ONCOLOGY NEW PATIENT CLINIC NOTE      DATE OF SERVICE:03/22/2021     REFERRING PHYSICIAN:  Allison Mosley MD, [facility], [address], [place], [place] [address], fax [phone].      REASON FOR CONSULTATION:  Evaluation for radium-223 treatment for metastatic castration-resistant prostate cancer.      HISTORY OF PRESENT ILLNESS:  Mr. Duane Gabrielson started therapy with [place] 223 on 2/22/21. He experienced once a day loose BM for 2 weeks and that subsided. Otherwise he did not notice any other side effects. He has mild fatigue. He has chronic pain in L ischial muscle insertion site for several years. Otherwise, he did not notice any fever, chills, new pains. Appetite is decent and weight is stable. He gets his lupron and zometa injections with his primary oncologist Dr Mosley and he confirms he received both last month.       ONCOLOGIC HISTORY:   1.  Prostate cancer.   -- 2004.  Patient was diagnosed with localized prostate cancer and underwent radical prostatectomy.  Pathology showed [place] 3+4 = 7 adenocarcinoma with perineural invasion.  He had T3 disease.   -- 2005.  For biochemical recurrence, he underwent salvage radiation therapy to prostate bed with a total of 70.2 Gy in 39 fractions, completed on 12/30/2005.   -- 2009.  Biochemical recurrence and started on intermittent ADT with leuprolide.   -- 04/2019.  CT chest, abdomen and pelvis with contrast with extensive sclerotic bony metastases and multiple tiny pulmonary nodules as well as nuclear medicine bone scan with extensive bone metastases in bilateral appendicular and axial skeleton, including in the entire spine.   -- 05/2019.  PSA was 169.  The patient started bicalutamide.   -- 09/2019.  CT chest, abdomen and pelvis with contrast and nuclear medicine bone scan showed disease progression and the PSA was elevated at 212.   -- 10/18/2019.  Completed palliative radiation to the right pelvis at 4 Gy per fraction  for a total of 5 fractions with Dr. Eldon Bar.   -- 10/2019.  Started abiraterone and prednisone with initial decline in PSA to a marielena of 26 by 02/2020.  However, PSA eventually increased to 57 by 06/2020.   -- 12/11/2019.  Completed palliative radiation to left pubic symphysis and left ischium 20 Gy total, under Dr. Eldon Bar's care.   -- 06/2020.  PET CT scan with numerous osseous metastases throughout the axial and proximal appendicular skeleton.   -- 07/2020.  Started enzalutamide 240 mg daily (due to drug-drug interaction with carbamazepine).  By 08/2020, his dose was reduced to 200 mg daily due to dizziness/headache.  His PSA progressed from 63 to 108 on therapy.  In 10/2020, a CT chest, abdomen and pelvis as well as nuclear medicine bone scan showed stable extensive osseous metastases.   -- 10/2020.  Decision made to switch him to docetaxel, which was started on 10/28/2020, and the patient completed 3 cycles.  His PSA trended up to 205 by cycle 3, day 1, and he was referred to me for radium-223 treatment.      REVIEW OF SYSTEMS:  A 14-point review of system negative except for as above.      PAST MEDICAL HISTORY:   1.  Prostate cancer as above.   2.  Anemia due to vitamin B12 deficiency.   3.  History of seizure disorder/epilepsy diagnosed in the 1980s and has been on a stable dose of carbamazepine since then.  Does not follow with a neurologist and no records available.   4.  History of recurrent UTIs with history of ESBL Proteus mirabilis, followed by Urology and on chronic suppressive Bactrim.      PAST SURGICAL HISTORY:    As above.      FAMILY HISTORY:   No clustering of malignancies in the family.      SOCIAL HISTORY:   The patient is a former smoker and quit in 1967.  He never used alcohol and denies illicit drug use.      ALLERGIES:   No Known Allergies     MEDICATIONS:    Current Outpatient Medications:      abiraterone (ZYTIGA) 500 MG TABS tablet, Take 1,000 mg by mouth daily, Disp: , Rfl:       aspirin 81 MG EC tablet, Take 81 mg by mouth daily, Disp: , Rfl:      carBAMazepine (CARBATROL) 300 MG 12 hr capsule, Take 300 mg by mouth 2 times daily, Disp: , Rfl:      carboxymethylcellul-glycerin (OPTIVE) 0.5-0.9 % SOLN ophthalmic solution, Apply to eye daily, Disp: , Rfl:      Cholecalciferol (VITAMIN D3) 1000 units CAPS, Take 1,000 Units by mouth daily, Disp: , Rfl:      cyanocobalamin (CYANOCOBALAMIN) 1000 MCG/ML injection, Inject 1,000 mcg into the muscle every 28 days, Disp: , Rfl:      HYDROcodone-acetaminophen (NORCO) 5-325 MG tablet, Take 0.5 tablets by mouth every 4 hours as needed 0.5 to 2 tabs every 4 hours as needed for pain, Disp: , Rfl:      ibuprofen (ADVIL/MOTRIN) 200 MG tablet, Take 200 mg by mouth every 4 hours as needed for moderate pain Taking one tab 2-3 times per day, Disp: , Rfl:      leuprolide, 4 Month, (ELIGARD) 30 MG, Inject 30 mg Subcutaneous every 4 months, Disp: , Rfl:      levothyroxine (SYNTHROID/LEVOTHROID) 112 MCG tablet, Take 112 mcg by mouth daily, Disp: , Rfl:      lisinopril-hydrochlorothiazide (PRINZIDE/ZESTORETIC) 10-12.5 MG tablet, Take 1 tablet by mouth daily, Disp: , Rfl:      Omega-3 Fatty Acids (FISH OIL PO), Take 500 mg by mouth daily, Disp: , Rfl:      omeprazole (PRILOSEC) 20 MG DR capsule, Take 20 mg by mouth daily, Disp: , Rfl:      predniSONE (DELTASONE) 10 MG tablet, Take 10 mg by mouth daily, Disp: , Rfl:      SHINGRIX injection, Inject 1 ml intramuscularly single dose, Disp: , Rfl: 1     simvastatin (ZOCOR) 20 MG tablet, Take 20 mg by mouth daily, Disp: , Rfl:      traMADol (ULTRAM) 50 MG tablet, Take 1 tablet by mouth 2 times daily as needed, Disp: , Rfl:      vitamin C (ASCORBIC ACID) 500 MG tablet, Take 500 mg by mouth daily, Disp: , Rfl:      zoledronic acid (ZOMETA) 4 MG/100ML SOLN, 4 mg, Disp: , Rfl:      PHYSICAL EXAMINATION:   VITAL SIGNS:  BP (!) 171/79   Pulse 92   Temp 98  F (36.7  C) (Tympanic)   Wt 92.1 kg (203 lb)   SpO2 98%   BMI 28.31  kg/m    GENERAL:  Elderly, well-nourished gentleman in a chair, in no acute distress.   HEENT:  Pupils equal, reactive to light and accommodation.  Extraocular movements intact.  Mucous membranes moist.   NECK:  No JVD or lymphadenopathy.   CHEST:  Good air entry bilaterally, normal work of breathing.   CARDIOVASCULAR:  S1, S2.  No murmurs, rubs or gallops.   ABDOMEN:  Soft, nontender, nondistended.  Bowel sounds present.   MUSCULOSKELETAL:  Range of motion in all extremities.  No clubbing, cyanosis or edema.   NEUROLOGIC:  Cranial nerves were grossly intact.  No focal deficits.   PSYCHIATRIC:  Mood and affect normal.      LABORATORY DATA:      Reviewed through [employer]Pad. Labs done on 3/15/21.  WBC 2.8  Hb 10.5  Plts 189  Alk P down to 164 from 261 in feb.  PSA up to 410 from 269 in Feb.      RADIOGRAPHIC AND PATHOLOGY DATA:    1. As above.    2. In addition, the patient had CT chest, abdomen and pelvis and bone scan on 01/22/2021, which compared with 10/2020.  Did not have any new evidence of metastatic disease.  He had extensive osteoblastic bony metastases, which are similar to prior with no new metastases.  He also has a large bladder diverticulum arising off of the distal urethra, which also connects with the prostatic urethra and contained several stones.  This diverticula has increased in size compared to prior exam and now measures 6 x 4.2 x 4.1 cm.  There was also enhancement and subtle inflammatory change of the left ureter, suggesting urethritis with kidneys within normal limits and there are multiple stable ovoid enhancing lesions along the base of the penile shaft, which are likely benign and appear similar to prior exam.  There is no visceral metastasis noted on this set of scans.      ASSESSMENT AND PLAN:  A 75-year-old gentleman with metastatic, castration-resistant prostate adenocarcinoma who is here for discussion of treatment options.      1.  Prostate cancer.   - I reviewed the available  diagnostic data at length with the patient and family and explained that he does appear to have bone predominant/exclusive metastatic CRPC.   - In terms of whether he has truly progressed on docetaxel, while he certainly has had ongoing elevation of PSAs, he does not have any clinical or radiographic evidence of disease progression at this time. Early PSA rise on treatment doesn't always correlate with progression.   - I discussed that his options include continuing with docetaxel chemotherapy for a few more cycles versus switching to another agent such as cabazitaxel or radium-223.  I do not have a clinical trial available for him at this time. The patient and family unequivocally want to proceed with switching treatment to radium-223.   - Reviewed the regimen which would be based on [facility] of radium injections every 4 weeks for a total of 6 cycles.  Reviewed side effects including fatigue, nausea, vomiting, diarrhea, constipation, myelosuppression with increased risk of infection, bleeding, anemia, increased risk of second malignancies, etc., in great detail.     - The patient wants to get started ASAP, but because of his impending bladder diverticulum procedure, we will plan to start him on this treatment in about 2-4 weeks from now. Discontinue docetaxel and prednisone effective now.   - Scans after cycle 3 or cycle 6 depending on how he is tolerating the treatment.  I did explain to the patient that PSA increases alone should not be a reason to pursue subsequent treatment switches, as radium-223 can have late are no PSA declines and still result in meaningful survival advantage.     He started Rad 223 treatment on 2/22/21. He is doing well and labs look adequate to received second treatment today and will proceed.     2.  Bone metastases.   - The patient will continue with ADT and bone antiresorptive therapies with his primary oncologist.  This is even more important given the risk of fractures and  SREs from radium-223 use.  He confirms receiving ADT and Zometa in February.      3.  Bladder diverticulum.   - underwent cystolitholapaxy with urology locally on 2/4/21.      Return to see me concurrently with cycle 3 of Xofigo (radium-223).       The patient and family were given the opportunity to ask questions, which were answered to their satisfaction.  They are in complete agreement with this plan.     Patient was seen and plan of care discussed with Myron Oakes  [facility] Onc Fellow  [phone]      cc:   Allison Mosley MD   [facility]   [address] [place] [address]  [phone]      Ruthy Christian CMA  [facility]   [address] [place] [address]  [phone]

## 2021-03-22 ENCOUNTER — ONCOLOGY VISIT (OUTPATIENT)
Dept: ONCOLOGY | Facility: CLINIC | Age: 76
End: 2021-03-22
Attending: INTERNAL MEDICINE
Payer: MEDICARE

## 2021-03-22 ENCOUNTER — HOSPITAL ENCOUNTER (OUTPATIENT)
Dept: NUCLEAR MEDICINE | Facility: CLINIC | Age: 76
Setting detail: NUCLEAR MEDICINE
End: 2021-03-22
Attending: INTERNAL MEDICINE
Payer: MEDICARE

## 2021-03-22 VITALS
SYSTOLIC BLOOD PRESSURE: 171 MMHG | WEIGHT: 203 LBS | DIASTOLIC BLOOD PRESSURE: 79 MMHG | TEMPERATURE: 98 F | HEART RATE: 92 BPM | BODY MASS INDEX: 28.31 KG/M2 | OXYGEN SATURATION: 98 %

## 2021-03-22 DIAGNOSIS — C79.51 BONE METASTASIS: ICD-10-CM

## 2021-03-22 DIAGNOSIS — C79.51 MALIGNANT NEOPLASM OF PROSTATE METASTATIC TO BONE (H): ICD-10-CM

## 2021-03-22 DIAGNOSIS — C61 PROSTATE CANCER (H): Primary | ICD-10-CM

## 2021-03-22 DIAGNOSIS — C61 MALIGNANT NEOPLASM OF PROSTATE METASTATIC TO BONE (H): ICD-10-CM

## 2021-03-22 PROCEDURE — A9606 RADIUM RA223 DICHLORIDE THER: HCPCS | Performed by: INTERNAL MEDICINE

## 2021-03-22 PROCEDURE — G1004 CDSM NDSC: HCPCS

## 2021-03-22 PROCEDURE — G1004 CDSM NDSC: HCPCS | Performed by: RADIOLOGY

## 2021-03-22 PROCEDURE — G0463 HOSPITAL OUTPT CLINIC VISIT: HCPCS | Mod: 25

## 2021-03-22 PROCEDURE — 99214 OFFICE O/P EST MOD 30 MIN: CPT | Mod: GC | Performed by: INTERNAL MEDICINE

## 2021-03-22 PROCEDURE — 79101 NUCLEAR RX IV ADMIN: CPT | Mod: 26 | Performed by: RADIOLOGY

## 2021-03-22 PROCEDURE — 344N000001 HC RX 344: Performed by: INTERNAL MEDICINE

## 2021-03-22 RX ADMIN — RADIUM RA 223 DICHLORIDE 144.2 UCI.: 30 INJECTION INTRAVENOUS at 13:36

## 2021-03-22 ASSESSMENT — PAIN SCALES - GENERAL: PAINLEVEL: EXTREME PAIN (8)

## 2021-03-22 NOTE — LETTER
3/22/2021         RE: Duane A Gabrielson  [id number] [id number]        Dear Colleague,    Thank you for referring your patient, Duane A Gabrielson, to the [facility]. Please see a copy of my visit note below.    MEDICAL ONCOLOGY NEW PATIENT CLINIC NOTE      DATE OF SERVICE:03/22/2021     REFERRING PHYSICIAN:  Allison Mosley MD, [facility], [address], [place] [address], fax [phone].      REASON FOR CONSULTATION:  Evaluation for radium-223 treatment for metastatic castration-resistant prostate cancer.      HISTORY OF PRESENT ILLNESS:  Mr. Duane Gabrielson started therapy with [place] 223 on 2/22/21. He experienced once a day loose BM for 2 weeks and that subsided. Otherwise he did not notice any other side effects. He has mild fatigue. He has chronic pain in L ischial muscle insertion site for several years. Otherwise, he did not notice any fever, chills, new pains. Appetite is decent and weight is stable. He gets his lupron and zometa injections with his primary oncologist Dr Mosley and he confirms he received both last month.       ONCOLOGIC HISTORY:   1.  Prostate cancer.   -- 2004.  Patient was diagnosed with localized prostate cancer and underwent radical prostatectomy.  Pathology showed [place] 3+4 = 7 adenocarcinoma with perineural invasion.  He had T3 disease.   -- 2005.  For biochemical recurrence, he underwent salvage radiation therapy to prostate bed with a total of 70.2 Gy in 39 fractions, completed on 12/30/2005.   -- 2009.  Biochemical recurrence and started on intermittent ADT with leuprolide.   -- 04/2019.  CT chest, abdomen and pelvis with contrast with extensive sclerotic bony metastases and multiple tiny pulmonary nodules as well as nuclear medicine bone scan with extensive bone metastases in bilateral appendicular and axial skeleton, including in the entire spine.   -- 05/2019.  PSA was 169.  The patient  started bicalutamide.   -- 09/2019.  CT chest, abdomen and pelvis with contrast and nuclear medicine bone scan showed disease progression and the PSA was elevated at 212.   -- 10/18/2019.  Completed palliative radiation to the right pelvis at 4 Gy per fraction for a total of 5 fractions with Dr. Eldon Bar.   -- 10/2019.  Started abiraterone and prednisone with initial decline in PSA to a marielena of 26 by 02/2020.  However, PSA eventually increased to 57 by 06/2020.   -- 12/11/2019.  Completed palliative radiation to left pubic symphysis and left ischium 20 Gy total, under Dr. Eldon Bar's care.   -- 06/2020.  PET CT scan with numerous osseous metastases throughout the axial and proximal appendicular skeleton.   -- 07/2020.  Started enzalutamide 240 mg daily (due to drug-drug interaction with carbamazepine).  By 08/2020, his dose was reduced to 200 mg daily due to dizziness/headache.  His PSA progressed from 63 to 108 on therapy.  In 10/2020, a CT chest, abdomen and pelvis as well as nuclear medicine bone scan showed stable extensive osseous metastases.   -- 10/2020.  Decision made to switch him to docetaxel, which was started on 10/28/2020, and the patient completed 3 cycles.  His PSA trended up to 205 by cycle 3, day 1, and he was referred to me for radium-223 treatment.      REVIEW OF SYSTEMS:  A 14-point review of system negative except for as above.      PAST MEDICAL HISTORY:   1.  Prostate cancer as above.   2.  Anemia due to vitamin B12 deficiency.   3.  History of seizure disorder/epilepsy diagnosed in the 1980s and has been on a stable dose of carbamazepine since then.  Does not follow with a neurologist and no records available.   4.  History of recurrent UTIs with history of ESBL Proteus mirabilis, followed by Urology and on chronic suppressive Bactrim.      PAST SURGICAL HISTORY:    As above.      FAMILY HISTORY:   No clustering of malignancies in the family.      SOCIAL HISTORY:   The patient is a former  smoker and quit in 1967.  He never used alcohol and denies illicit drug use.      ALLERGIES:   No Known Allergies     MEDICATIONS:    Current Outpatient Medications:      abiraterone (ZYTIGA) 500 MG TABS tablet, Take 1,000 mg by mouth daily, Disp: , Rfl:      aspirin 81 MG EC tablet, Take 81 mg by mouth daily, Disp: , Rfl:      carBAMazepine (CARBATROL) 300 MG 12 hr capsule, Take 300 mg by mouth 2 times daily, Disp: , Rfl:      carboxymethylcellul-glycerin (OPTIVE) 0.5-0.9 % SOLN ophthalmic solution, Apply to eye daily, Disp: , Rfl:      Cholecalciferol (VITAMIN D3) 1000 units CAPS, Take 1,000 Units by mouth daily, Disp: , Rfl:      cyanocobalamin (CYANOCOBALAMIN) 1000 MCG/ML injection, Inject 1,000 mcg into the muscle every 28 days, Disp: , Rfl:      HYDROcodone-acetaminophen (NORCO) 5-325 MG tablet, Take 0.5 tablets by mouth every 4 hours as needed 0.5 to 2 tabs every 4 hours as needed for pain, Disp: , Rfl:      ibuprofen (ADVIL/MOTRIN) 200 MG tablet, Take 200 mg by mouth every 4 hours as needed for moderate pain Taking one tab 2-3 times per day, Disp: , Rfl:      leuprolide, 4 Month, (ELIGARD) 30 MG, Inject 30 mg Subcutaneous every 4 months, Disp: , Rfl:      levothyroxine (SYNTHROID/LEVOTHROID) 112 MCG tablet, Take 112 mcg by mouth daily, Disp: , Rfl:      lisinopril-hydrochlorothiazide (PRINZIDE/ZESTORETIC) 10-12.5 MG tablet, Take 1 tablet by mouth daily, Disp: , Rfl:      Omega-3 Fatty Acids (FISH OIL PO), Take 500 mg by mouth daily, Disp: , Rfl:      omeprazole (PRILOSEC) 20 MG DR capsule, Take 20 mg by mouth daily, Disp: , Rfl:      predniSONE (DELTASONE) 10 MG tablet, Take 10 mg by mouth daily, Disp: , Rfl:      SHINGRIX injection, Inject 1 ml intramuscularly single dose, Disp: , Rfl: 1     simvastatin (ZOCOR) 20 MG tablet, Take 20 mg by mouth daily, Disp: , Rfl:      traMADol (ULTRAM) 50 MG tablet, Take 1 tablet by mouth 2 times daily as needed, Disp: , Rfl:      vitamin C (ASCORBIC ACID) 500 MG tablet,  Take 500 mg by mouth daily, Disp: , Rfl:      zoledronic acid (ZOMETA) 4 MG/100ML SOLN, 4 mg, Disp: , Rfl:      PHYSICAL EXAMINATION:   VITAL SIGNS:  BP (!) 171/79   Pulse 92   Temp 98  F (36.7  C) (Tympanic)   Wt 92.1 kg (203 lb)   SpO2 98%   BMI 28.31 kg/m    GENERAL:  Elderly, well-nourished gentleman in a chair, in no acute distress.   HEENT:  Pupils equal, reactive to light and accommodation.  Extraocular movements intact.  Mucous membranes moist.   NECK:  No JVD or lymphadenopathy.   CHEST:  Good air entry bilaterally, normal work of breathing.   CARDIOVASCULAR:  S1, S2.  No murmurs, rubs or gallops.   ABDOMEN:  Soft, nontender, nondistended.  Bowel sounds present.   MUSCULOSKELETAL:  Range of motion in all extremities.  No clubbing, cyanosis or edema.   NEUROLOGIC:  Cranial nerves were grossly intact.  No focal deficits.   PSYCHIATRIC:  Mood and affect normal.      LABORATORY DATA:      Reviewed [facility]. Labs done on 3/15/21.  WBC 2.8  Hb 10.5  Plts 189  Alk P down to 164 from 261 in feb.  PSA up to 410 from 269 in Feb.      RADIOGRAPHIC AND PATHOLOGY DATA:    1. As above.    2. In addition, the patient had CT chest, abdomen and pelvis and bone scan on 01/22/2021, which compared with 10/2020.  Did not have any new evidence of metastatic disease.  He had extensive osteoblastic bony metastases, which are similar to prior with no new metastases.  He also has a large bladder diverticulum arising off of the distal urethra, which also connects with the prostatic urethra and contained several stones.  This diverticula has increased in size compared to prior exam and now measures 6 x 4.2 x 4.1 cm.  There was also enhancement and subtle inflammatory change of the left ureter, suggesting urethritis with kidneys within normal limits and there are multiple stable ovoid enhancing lesions along the base of the penile shaft, which are likely benign and appear similar to prior exam.  There is no visceral  metastasis noted on this set of scans.      ASSESSMENT AND PLAN:  A 75-year-old gentleman with metastatic, castration-resistant prostate adenocarcinoma who is here for discussion of treatment options.      1.  Prostate cancer.   - I reviewed the available diagnostic data at length with the patient and family and explained that he does appear to have bone predominant/exclusive metastatic CRPC.   - In terms of whether he has truly progressed on docetaxel, while he certainly has had ongoing elevation of PSAs, he does not have any clinical or radiographic evidence of disease progression at this time. Early PSA rise on treatment doesn't always correlate with progression.   - I discussed that his options include continuing with docetaxel chemotherapy for a few more cycles versus switching to another agent such as cabazitaxel or radium-223.  I do not have a clinical trial available for him at this time. The patient and family unequivocally want to proceed with switching treatment to radium-223.   - Reviewed the regimen which would be based on the ALSYMPCA trial of radium injections every 4 weeks for a total of 6 cycles.  Reviewed side effects including fatigue, nausea, vomiting, diarrhea, constipation, myelosuppression with increased risk of infection, bleeding, anemia, increased risk of second malignancies, etc., in great detail.     - The patient wants to get started ASAP, but because of his impending bladder diverticulum procedure, we will plan to start him on this treatment in about 2-4 weeks from now. Discontinue docetaxel and prednisone effective now.   - Scans after cycle 3 or cycle 6 depending on how he is tolerating the treatment.  I did explain to the patient that PSA increases alone should not be a reason to pursue subsequent treatment switches, as radium-223 can have late are no PSA declines and still result in meaningful survival advantage.     He started Rad 223 treatment on 2/22/21. He is doing well and labs  look adequate to received second treatment today and will proceed.     2.  Bone metastases.   - The patient will continue with ADT and bone antiresorptive therapies with his primary oncologist.  This is even more important given the risk of fractures and SREs from radium-223 use.  He confirms receiving ADT and Zometa in February.      3.  Bladder diverticulum.   - underwent cystolitholapaxy with urology locally on 2/4/21.      Return to see me concurrently with cycle 3 of Xofigo (radium-223).       The patient and family were given the opportunity to ask questions, which were answered to their satisfaction.  They are in complete agreement with this plan.     Patient was seen and plan of care discussed with Myron Oakes  [facility] Onc Fellow  [phone]      cc:   Allison Mosley MD   [facility]   [address] [place] [address]      Ruthy Christian CMA  [facility]   [address] [place] [address]  [phone]        Attestation signed by Mathieu Pineda MD at 3/26/2021  6:14 AM:  Physician Attestation   I, Mathieu Pineda MD, saw this patient and agree with the findings and plan of care as documented in the note.      Items personally reviewed/procedural attestation: vitals, labs, and pt started on Radium last month and is doing well.    Mathieu Pineda MD        Again, thank you for allowing me to participate in the care of your patient.        Sincerely,        Mathieu Pineda MD

## 2021-03-22 NOTE — NURSING NOTE
"Oncology Rooming Note    March 22, 2021 12:36 PM   Duane A Gabrielson is a 75 year old male who presents for:    Chief Complaint   Patient presents with     Oncology Clinic Visit     prostate cancer     Initial Vitals: BP (!) 171/79   Pulse 92   Temp 98  F (36.7  C) (Tympanic)   Wt 92.1 kg (203 lb)   SpO2 98%   BMI 28.31 kg/m   Estimated body mass index is 28.31 kg/m  as calculated from the following:    Height as of 1/27/21: 1.803 m (5' 11\").    Weight as of this encounter: 92.1 kg (203 lb). Body surface area is 2.15 meters squared.  Extreme Pain (8) Comment: Data Unavailable   No LMP for male patient.  Allergies reviewed: Yes  Medications reviewed: Yes    Medications: Medication refills not needed today.  Pharmacy name entered into [employer]: [facility] - [place], [place] - [address]    Clinical concerns: none       Jayda Kaufman CMA            "

## 2021-04-10 ENCOUNTER — HEALTH MAINTENANCE LETTER (OUTPATIENT)
Age: 76
End: 2021-04-10

## 2021-04-16 NOTE — PROGRESS NOTES
MEDICAL ONCOLOGY NEW PATIENT CLINIC NOTE      DATE OF SERVICE:04/16/2021     REFERRING PHYSICIAN:  Allison Mosley MD, [facility], [address], [place], [place] [address], fax [phone].      REASON FOR CONSULTATION:  Follow up metastatic castration-resistant prostate cancer.      HISTORY OF PRESENT ILLNESS:  Mr. Duane Gabrielson started therapy with [place] 223 on 2/22/21. He experienced once a day loose BM for 2 weeks and that subsided. Otherwise he did not notice any other side effects. He has mild fatigue. He has chronic pain in L ischial muscle insertion site for several years. No saddle numbness or radiculopathy. Otherwise, he did not notice any fever, chills, new pains. Appetite is good and weight is stable. He is doing yard work now that the weather is warming. He gets his lupron and zometa injections with his primary oncologist Dr Mosley and he confirms he received both in February      10 point review of systems otherwise negative     ONCOLOGIC HISTORY:   1.  Prostate cancer.   -- 2004.  Patient was diagnosed with localized prostate cancer and underwent radical prostatectomy.  Pathology showed [place] 3+4 = 7 adenocarcinoma with perineural invasion.  He had T3 disease.   -- 2005.  For biochemical recurrence, he underwent salvage radiation therapy to prostate bed with a total of 70.2 Gy in 39 fractions, completed on 12/30/2005.   -- 2009.  Biochemical recurrence and started on intermittent ADT with leuprolide.   -- 04/2019.  CT chest, abdomen and pelvis with contrast with extensive sclerotic bony metastases and multiple tiny pulmonary nodules as well as nuclear medicine bone scan with extensive bone metastases in bilateral appendicular and axial skeleton, including in the entire spine.   -- 05/2019.  PSA was 169.  The patient started bicalutamide.   -- 09/2019.  CT chest, abdomen and pelvis with contrast and nuclear medicine bone scan showed disease progression and the PSA was  elevated at 212.   -- 10/18/2019.  Completed palliative radiation to the right pelvis at 4 Gy per fraction for a total of 5 fractions with Dr. Eldon Bar.   -- 10/2019.  Started abiraterone and prednisone with initial decline in PSA to a marielena of 26 by 02/2020.  However, PSA eventually increased to 57 by 06/2020.   -- 12/11/2019.  Completed palliative radiation to left pubic symphysis and left ischium 20 Gy total, under Dr. Eldon Bar's care.   -- 06/2020.  PET CT scan with numerous osseous metastases throughout the axial and proximal appendicular skeleton.   -- 07/2020.  Started enzalutamide 240 mg daily (due to drug-drug interaction with carbamazepine).  By 08/2020, his dose was reduced to 200 mg daily due to dizziness/headache.  His PSA progressed from 63 to 108 on therapy.  In 10/2020, a CT chest, abdomen and pelvis as well as nuclear medicine bone scan showed stable extensive osseous metastases.   -- 10/2020.  Decision made to switch him to docetaxel, which was started on 10/28/2020, and the patient completed 3 cycles.  His PSA trended up to 205 by cycle 3, day 1, and he was referred to me for radium-223 treatment.      REVIEW OF SYSTEMS:  A 14-point review of system negative except for as above.      PAST MEDICAL HISTORY:   1.  Prostate cancer as above.   2.  Anemia due to vitamin B12 deficiency.   3.  History of seizure disorder/epilepsy diagnosed in the 1980s and has been on a stable dose of carbamazepine since then.  Does not follow with a neurologist and no records available.   4.  History of recurrent UTIs with history of ESBL Proteus mirabilis, followed by Urology and on chronic suppressive Bactrim.      PAST SURGICAL HISTORY:    As above.      FAMILY HISTORY:   No clustering of malignancies in the family.      SOCIAL HISTORY:   The patient is a former smoker and quit in 1967.  He never used alcohol and denies illicit drug use.      ALLERGIES:   No Known Allergies     MEDICATIONS:    Current Outpatient  Medications:      abiraterone (ZYTIGA) 500 MG TABS tablet, Take 1,000 mg by mouth daily, Disp: , Rfl:      aspirin 81 MG EC tablet, Take 81 mg by mouth daily, Disp: , Rfl:      carBAMazepine (CARBATROL) 300 MG 12 hr capsule, Take 300 mg by mouth 2 times daily, Disp: , Rfl:      carboxymethylcellul-glycerin (OPTIVE) 0.5-0.9 % SOLN ophthalmic solution, Apply to eye daily, Disp: , Rfl:      Cholecalciferol (VITAMIN D3) 1000 units CAPS, Take 1,000 Units by mouth daily, Disp: , Rfl:      cyanocobalamin (CYANOCOBALAMIN) 1000 MCG/ML injection, Inject 1,000 mcg into the muscle every 28 days, Disp: , Rfl:      HYDROcodone-acetaminophen (NORCO) 5-325 MG tablet, Take 0.5 tablets by mouth every 4 hours as needed 0.5 to 2 tabs every 4 hours as needed for pain, Disp: , Rfl:      ibuprofen (ADVIL/MOTRIN) 200 MG tablet, Take 200 mg by mouth every 4 hours as needed for moderate pain Taking one tab 2-3 times per day, Disp: , Rfl:      leuprolide, 4 Month, (ELIGARD) 30 MG, Inject 30 mg Subcutaneous every 4 months, Disp: , Rfl:      levothyroxine (SYNTHROID/LEVOTHROID) 112 MCG tablet, Take 112 mcg by mouth daily, Disp: , Rfl:      lisinopril-hydrochlorothiazide (PRINZIDE/ZESTORETIC) 10-12.5 MG tablet, Take 1 tablet by mouth daily, Disp: , Rfl:      Omega-3 Fatty Acids (FISH OIL PO), Take 500 mg by mouth daily, Disp: , Rfl:      omeprazole (PRILOSEC) 20 MG DR capsule, Take 20 mg by mouth daily, Disp: , Rfl:      predniSONE (DELTASONE) 10 MG tablet, Take 10 mg by mouth daily, Disp: , Rfl:      SHINGRIX injection, Inject 1 ml intramuscularly single dose, Disp: , Rfl: 1     simvastatin (ZOCOR) 20 MG tablet, Take 20 mg by mouth daily, Disp: , Rfl:      traMADol (ULTRAM) 50 MG tablet, Take 1 tablet by mouth 2 times daily as needed, Disp: , Rfl:      vitamin C (ASCORBIC ACID) 500 MG tablet, Take 500 mg by mouth daily, Disp: , Rfl:      zoledronic acid (ZOMETA) 4 MG/100ML SOLN, 4 mg, Disp: , Rfl:      PHYSICAL EXAMINATION:   VITAL SIGNS:  BP  111/56   Pulse 98   Temp 97.6  F (36.4  C) (Oral)   Wt 91.9 kg (202 lb 9.6 oz)   SpO2 97%   BMI 28.26 kg/m    GENERAL:  Elderly, well-nourished gentleman in a chair, in no acute distress.   HEENT:  Pupils equal, reactive to light and accommodation.  Extraocular movements intact.  Mucous membranes moist.   NECK:  No JVD or lymphadenopathy.   CHEST:  Good air entry bilaterally, normal work of breathing.   CARDIOVASCULAR:  S1, S2.  No murmurs, rubs or gallops.   ABDOMEN:  Soft, nontender, nondistended.  Bowel sounds present.   MUSCULOSKELETAL:  Range of motion in all extremities.  No clubbing, cyanosis or edema.   NEUROLOGIC:  Cranial nerves were grossly intact.  No focal deficits.   PSYCHIATRIC:  Mood and affect normal.      LABORATORY DATA:      Reviewed through [facility]. Labs done on 3/12/21.  WBC 3.6  Hb 9.6  Plts 197  Alk P down to 110 from 261 in feb.  PSA up to 529 from 410 in March.      RADIOGRAPHIC AND PATHOLOGY DATA:    1. As above.    2. In addition, the patient had CT chest, abdomen and pelvis and bone scan on 01/22/2021, which compared with 10/2020.  Did not have any new evidence of metastatic disease.  He had extensive osteoblastic bony metastases, which are similar to prior with no new metastases.  He also has a large bladder diverticulum arising off of the distal urethra, which also connects with the prostatic urethra and contained several stones.  This diverticula has increased in size compared to prior exam and now measures 6 x 4.2 x 4.1 cm.  There was also enhancement and subtle inflammatory change of the left ureter, suggesting urethritis with kidneys within normal limits and there are multiple stable ovoid enhancing lesions along the base of the penile shaft, which are likely benign and appear similar to prior exam.  There is no visceral metastasis noted on this set of scans.      ASSESSMENT AND PLAN:  A 75-year-old gentleman with metastatic, castration-resistant prostate adenocarcinoma  who is here for discussion of treatment options.      1.  Prostate cancer.   -Previously reviewed the available diagnostic data at length with the patient and family and explained that he does appear to have bone predominant/exclusive metastatic CRPC.   - In terms of whether he truly progressed on docetaxel, while he certainly has had ongoing elevation of PSAs, he does not have any clinical or radiographic evidence of disease progression at this time. Early PSA rise on treatment doesn't always correlate with progression.   -ultimately, the patient and family chose to proceed with switching treatment to radium-223, began in 02/2021.   - The regimen, based on the ALSYMPCA trial, is radium injections every 4 weeks for a total of 6 cycles.  Reviewed side effects including fatigue, nausea, vomiting, diarrhea, constipation, myelosuppression with increased risk of infection, bleeding, anemia, increased risk of second malignancies, etc., in great detail.      - Scans after cycle 3 or cycle 6 depending on how he is tolerating the treatment. Today will be dose 3, and I do not feel scans next month are necessary in absent of symptoms. I did again explain to the patient that PSA increases alone should not be a reason to pursue subsequent treatment switches, as radium-223 can have late are no PSA declines and still result in meaningful survival advantage.     His alk phos has now normalized which is encouraging and he is also feeling well. Labs look adequate to receive third treatment today and will proceed. We will watch his anemia with continued monthly labs.     2.  Bone metastases.   - The patient will continue with ADT and bone antiresorptive therapies with his primary oncologist.  This is even more important given the risk of fractures and SREs from radium-223 use.  He confirms receiving ADT and Zometa in February, he receives every 4 months.      3.  Bladder diverticulum.   - underwent cystolitholapaxy with urology  locally on 2/4/21.      60 minutes spent on the date of the encounter doing chart review, interpretation of tests, patient visit, documentation, discussion with other provider(s) and discussion with family      Meena Dowell CNP on 4/19/2021 at 11:34 AM

## 2021-04-19 ENCOUNTER — ONCOLOGY VISIT (OUTPATIENT)
Dept: ONCOLOGY | Facility: CLINIC | Age: 76
End: 2021-04-19
Attending: NURSE PRACTITIONER
Payer: MEDICARE

## 2021-04-19 ENCOUNTER — HOSPITAL ENCOUNTER (OUTPATIENT)
Dept: NUCLEAR MEDICINE | Facility: CLINIC | Age: 76
Setting detail: NUCLEAR MEDICINE
End: 2021-04-19
Attending: INTERNAL MEDICINE
Payer: MEDICARE

## 2021-04-19 VITALS
WEIGHT: 202.6 LBS | HEART RATE: 98 BPM | TEMPERATURE: 97.6 F | DIASTOLIC BLOOD PRESSURE: 56 MMHG | SYSTOLIC BLOOD PRESSURE: 111 MMHG | BODY MASS INDEX: 28.26 KG/M2 | OXYGEN SATURATION: 97 %

## 2021-04-19 DIAGNOSIS — C79.51 MALIGNANT NEOPLASM OF PROSTATE METASTATIC TO BONE (H): ICD-10-CM

## 2021-04-19 DIAGNOSIS — C61 MALIGNANT NEOPLASM OF PROSTATE METASTATIC TO BONE (H): ICD-10-CM

## 2021-04-19 DIAGNOSIS — C79.51 BONE METASTASIS: ICD-10-CM

## 2021-04-19 DIAGNOSIS — C61 PROSTATE CANCER (H): Primary | ICD-10-CM

## 2021-04-19 PROCEDURE — 79101 NUCLEAR RX IV ADMIN: CPT | Mod: MG

## 2021-04-19 PROCEDURE — A9606 RADIUM RA223 DICHLORIDE THER: HCPCS | Performed by: INTERNAL MEDICINE

## 2021-04-19 PROCEDURE — G0463 HOSPITAL OUTPT CLINIC VISIT: HCPCS

## 2021-04-19 PROCEDURE — G1004 CDSM NDSC: HCPCS | Performed by: RADIOLOGY

## 2021-04-19 PROCEDURE — 79101 NUCLEAR RX IV ADMIN: CPT | Mod: 26 | Performed by: RADIOLOGY

## 2021-04-19 PROCEDURE — 99215 OFFICE O/P EST HI 40 MIN: CPT | Performed by: NURSE PRACTITIONER

## 2021-04-19 PROCEDURE — G0463 HOSPITAL OUTPT CLINIC VISIT: HCPCS | Mod: 25

## 2021-04-19 PROCEDURE — 344N000001 HC RX 344: Performed by: INTERNAL MEDICINE

## 2021-04-19 RX ADMIN — RADIUM RA 223 DICHLORIDE 139.7 UCI.: 30 INJECTION INTRAVENOUS at 14:04

## 2021-04-19 ASSESSMENT — PAIN SCALES - GENERAL: PAINLEVEL: EXTREME PAIN (8)

## 2021-04-19 NOTE — LETTER
4/19/2021         RE: Duane A Gabrielson  [id number] [facility] [address]      MEDICAL ONCOLOGY NEW PATIENT CLINIC NOTE      DATE OF SERVICE:04/16/2021     REFERRING PHYSICIAN:  Allison Mosley MD, [facility], [address], [place] [address], fax [phone].      REASON FOR CONSULTATION:  Follow up metastatic castration-resistant prostate cancer.      HISTORY OF PRESENT ILLNESS:  Mr. Duane Gabrielson started therapy with [place] 223 on 2/22/21. He experienced once a day loose BM for 2 weeks and that subsided. Otherwise he did not notice any other side effects. He has mild fatigue. He has chronic pain in L ischial muscle insertion site for several years. No saddle numbness or radiculopathy. Otherwise, he did not notice any fever, chills, new pains. Appetite is good and weight is stable. He is doing yard work now that the weather is warming. He gets his lupron and zometa injections with his primary oncologist Dr Mosley and he confirms he received both in February      10 point review of systems otherwise negative     ONCOLOGIC HISTORY:   1.  Prostate cancer.   -- 2004.  Patient was diagnosed with localized prostate cancer and underwent radical prostatectomy.  Pathology showed Eligio 3+4 = 7 adenocarcinoma with perineural invasion.  He had T3 disease.   -- 2005.  For biochemical recurrence, he underwent salvage radiation therapy to prostate bed with a total of 70.2 Gy in 39 fractions, completed on 12/30/2005.   -- 2009.  Biochemical recurrence and started on intermittent ADT with leuprolide.   -- 04/2019.  CT chest, abdomen and pelvis with contrast with extensive sclerotic bony metastases and multiple tiny pulmonary nodules as well as nuclear medicine bone scan with extensive bone metastases in bilateral appendicular and axial skeleton, including in the entire spine.   -- 05/2019.  PSA was 169.  The patient started bicalutamide.   -- 09/2019.  CT chest, abdomen and  pelvis with contrast and nuclear medicine bone scan showed disease progression and the PSA was elevated at 212.   -- 10/18/2019.  Completed palliative radiation to the right pelvis at 4 Gy per fraction for a total of 5 fractions with Dr. Eldon Bar.   -- 10/2019.  Started abiraterone and prednisone with initial decline in PSA to a marielena of 26 by 02/2020.  However, PSA eventually increased to 57 by 06/2020.   -- 12/11/2019.  Completed palliative radiation to left pubic symphysis and left ischium 20 Gy total, under Dr. Eldon Bar's care.   -- 06/2020.  PET CT scan with numerous osseous metastases throughout the axial and proximal appendicular skeleton.   -- 07/2020.  Started enzalutamide 240 mg daily (due to drug-drug interaction with carbamazepine).  By 08/2020, his dose was reduced to 200 mg daily due to dizziness/headache.  His PSA progressed from 63 to 108 on therapy.  In 10/2020, a CT chest, abdomen and pelvis as well as nuclear medicine bone scan showed stable extensive osseous metastases.   -- 10/2020.  Decision made to switch him to docetaxel, which was started on 10/28/2020, and the patient completed 3 cycles.  His PSA trended up to 205 by cycle 3, day 1, and he was referred to me for radium-223 treatment.      REVIEW OF SYSTEMS:  A 14-point review of system negative except for as above.      PAST MEDICAL HISTORY:   1.  Prostate cancer as above.   2.  Anemia due to vitamin B12 deficiency.   3.  History of seizure disorder/epilepsy diagnosed in the 1980s and has been on a stable dose of carbamazepine since then.  Does not follow with a neurologist and no records available.   4.  History of recurrent UTIs with history of ESBL Proteus mirabilis, followed by Urology and on chronic suppressive Bactrim.      PAST SURGICAL HISTORY:    As above.      FAMILY HISTORY:   No clustering of malignancies in the family.      SOCIAL HISTORY:   The patient is a former smoker and quit in 1967.  He never used alcohol and denies  illicit drug use.      ALLERGIES:   No Known Allergies     MEDICATIONS:    Current Outpatient Medications:      abiraterone (ZYTIGA) 500 MG TABS tablet, Take 1,000 mg by mouth daily, Disp: , Rfl:      aspirin 81 MG EC tablet, Take 81 mg by mouth daily, Disp: , Rfl:      carBAMazepine (CARBATROL) 300 MG 12 hr capsule, Take 300 mg by mouth 2 times daily, Disp: , Rfl:      carboxymethylcellul-glycerin (OPTIVE) 0.5-0.9 % SOLN ophthalmic solution, Apply to eye daily, Disp: , Rfl:      Cholecalciferol (VITAMIN D3) 1000 units CAPS, Take 1,000 Units by mouth daily, Disp: , Rfl:      cyanocobalamin (CYANOCOBALAMIN) 1000 MCG/ML injection, Inject 1,000 mcg into the muscle every 28 days, Disp: , Rfl:      HYDROcodone-acetaminophen (NORCO) 5-325 MG tablet, Take 0.5 tablets by mouth every 4 hours as needed 0.5 to 2 tabs every 4 hours as needed for pain, Disp: , Rfl:      ibuprofen (ADVIL/MOTRIN) 200 MG tablet, Take 200 mg by mouth every 4 hours as needed for moderate pain Taking one tab 2-3 times per day, Disp: , Rfl:      leuprolide, 4 Month, (ELIGARD) 30 MG, Inject 30 mg Subcutaneous every 4 months, Disp: , Rfl:      levothyroxine (SYNTHROID/LEVOTHROID) 112 MCG tablet, Take 112 mcg by mouth daily, Disp: , Rfl:      lisinopril-hydrochlorothiazide (PRINZIDE/ZESTORETIC) 10-12.5 MG tablet, Take 1 tablet by mouth daily, Disp: , Rfl:      Omega-3 Fatty Acids (FISH OIL PO), Take 500 mg by mouth daily, Disp: , Rfl:      omeprazole (PRILOSEC) 20 MG DR capsule, Take 20 mg by mouth daily, Disp: , Rfl:      predniSONE (DELTASONE) 10 MG tablet, Take 10 mg by mouth daily, Disp: , Rfl:      SHINGRIX injection, Inject 1 ml intramuscularly single dose, Disp: , Rfl: 1     simvastatin (ZOCOR) 20 MG tablet, Take 20 mg by mouth daily, Disp: , Rfl:      traMADol (ULTRAM) 50 MG tablet, Take 1 tablet by mouth 2 times daily as needed, Disp: , Rfl:      vitamin C (ASCORBIC ACID) 500 MG tablet, Take 500 mg by mouth daily, Disp: , Rfl:      zoledronic  acid (ZOMETA) 4 MG/100ML SOLN, 4 mg, Disp: , Rfl:      PHYSICAL EXAMINATION:   VITAL SIGNS:  /56   Pulse 98   Temp 97.6  F (36.4  C) (Oral)   Wt 91.9 kg (202 lb 9.6 oz)   SpO2 97%   BMI 28.26 kg/m    GENERAL:  Elderly, well-nourished gentleman in a chair, in no acute distress.   HEENT:  Pupils equal, reactive to light and accommodation.  Extraocular movements intact.  Mucous membranes moist.   NECK:  No JVD or lymphadenopathy.   CHEST:  Good air entry bilaterally, normal work of breathing.   CARDIOVASCULAR:  S1, S2.  No murmurs, rubs or gallops.   ABDOMEN:  Soft, nontender, nondistended.  Bowel sounds present.   MUSCULOSKELETAL:  Range of motion in all extremities.  No clubbing, cyanosis or edema.   NEUROLOGIC:  Cranial nerves were grossly intact.  No focal deficits.   PSYCHIATRIC:  Mood and affect normal.      LABORATORY DATA:      [facility] Labs done on 3/12/21.  WBC 3.6  Hb 9.6  Plts 197  Alk P down to 110 from 261 in feb.  PSA up to 529 from 410 in March.      RADIOGRAPHIC AND PATHOLOGY DATA:    1. As above.    2. In addition, the patient had CT chest, abdomen and pelvis and bone scan on 01/22/2021, which compared with 10/2020.  Did not have any new evidence of metastatic disease.  He had extensive osteoblastic bony metastases, which are similar to prior with no new metastases.  He also has a large bladder diverticulum arising off of the distal urethra, which also connects with the prostatic urethra and contained several stones.  This diverticula has increased in size compared to prior exam and now measures 6 x 4.2 x 4.1 cm.  There was also enhancement and subtle inflammatory change of the left ureter, suggesting urethritis with kidneys within normal limits and there are multiple stable ovoid enhancing lesions along the base of the penile shaft, which are likely benign and appear similar to prior exam.  There is no visceral metastasis noted on this set of scans.      ASSESSMENT  AND PLAN:  A 75-year-old gentleman with metastatic, castration-resistant prostate adenocarcinoma who is here for discussion of treatment options.      1.  Prostate cancer.   -Previously reviewed the available diagnostic data at length with the patient and family and explained that he does appear to have bone predominant/exclusive metastatic CRPC.   - In terms of whether he truly progressed on docetaxel, while he certainly has had ongoing elevation of PSAs, he does not have any clinical or radiographic evidence of disease progression at this time. Early PSA rise on treatment doesn't always correlate with progression.   -ultimately, the patient and family chose to proceed with switching treatment to radium-223, began in 02/2021.   - The regimen, based on the ALSYMPCA trial, is radium injections every 4 weeks for a total of 6 cycles.  Reviewed side effects including fatigue, nausea, vomiting, diarrhea, constipation, myelosuppression with increased risk of infection, bleeding, anemia, increased risk of second malignancies, etc., in great detail.      - Scans after cycle 3 or cycle 6 depending on how he is tolerating the treatment. Today will be dose 3, and I do not feel scans next month are necessary in absent of symptoms. I did again explain to the patient that PSA increases alone should not be a reason to pursue subsequent treatment switches, as radium-223 can have late are no PSA declines and still result in meaningful survival advantage.     His alk phos has now normalized which is encouraging and he is also feeling well. Labs look adequate to receive third treatment today and will proceed. We will watch his anemia with continued monthly labs.     2.  Bone metastases.   - The patient will continue with ADT and bone antiresorptive therapies with his primary oncologist.  This is even more important given the risk of fractures and SREs from radium-223 use.  He confirms receiving ADT and Zometa in February, he receives  every 4 months.      3.  Bladder diverticulum.   - underwent cystolitholapaxy with urology locally on 2/4/21.      60 minutes spent on the date of the encounter doing chart review, interpretation of tests, patient visit, documentation, discussion with other provider(s) and discussion with family      Meena Dowell CNP on 4/19/2021 at 11:34 AM            Meena Dowell CNP

## 2021-04-19 NOTE — NURSING NOTE
"Oncology Rooming Note    April 19, 2021 11:03 AM   Duane A Gabrielson is a 75 year old male who presents for:    Chief Complaint   Patient presents with     Oncology Clinic Visit     prostate cancer     Initial Vitals: /56   Pulse 98   Temp 97.6  F (36.4  C) (Oral)   Wt 91.9 kg (202 lb 9.6 oz)   SpO2 97%   BMI 28.26 kg/m   Estimated body mass index is 28.26 kg/m  as calculated from the following:    Height as of 1/27/21: 1.803 m (5' 11\").    Weight as of this encounter: 91.9 kg (202 lb 9.6 oz). Body surface area is 2.15 meters squared.  Extreme Pain (8) Comment: Data Unavailable   No LMP for male patient.  Allergies reviewed: Yes  Medications reviewed: Yes    Medications: Medication refills not needed today.  Pharmacy name entered into [facility]: [facility] - [place], [place] - [address]    Clinical concerns: none       Jayda Kaufman CMA            "

## 2021-05-11 ENCOUNTER — PATIENT OUTREACH (OUTPATIENT)
Dept: ONCOLOGY | Facility: CLINIC | Age: 76
End: 2021-05-11

## 2021-05-11 NOTE — PROGRESS NOTES
TC to pt's sister, Norma, who manages care for pt to review low Hgb result. Norma stated that pt has seemed to be more fatigued lately and is not sure if pt has had any s/s of bleeding such as blood in his stool or urine. Told Norma that pt's result will need to be reviewed by Dr. Pineda prior to deciding how to proceed or not proceed with next planned Xofigo tx as pt is not meeting tx parameters, and that pt may require a blood transfusion. Norma stated she will discuss with pt, ask him about s/s of abnormal bleeding, and that he likely could get a transfusion if recommended by Dr. Pineda locally per his providers in [place]. Writer to discuss with Dr. Pineda and make a plan.

## 2021-05-12 ENCOUNTER — PATIENT OUTREACH (OUTPATIENT)
Dept: ONCOLOGY | Facility: CLINIC | Age: 76
End: 2021-05-12

## 2021-05-14 NOTE — PROGRESS NOTES
TC to pt's local oncology NP, Sabrina ([phone] & [phone]), and to pt's sister, Norma to arrange blood transfusion and follow-up before proceeding with Xofigo next week. Plan made for pt to get a CBC locally on Tues and proceed with Xofigo if no questions or concerns per Dr. Pineda as his schedule is full the day pt was planning on travelling to [place] for treatment on 05/19/2021.

## 2021-05-18 ENCOUNTER — PATIENT OUTREACH (OUTPATIENT)
Dept: ONCOLOGY | Facility: CLINIC | Age: 76
End: 2021-05-18

## 2021-05-18 NOTE — PROGRESS NOTES
TC from Sabrina, pt's NP in [place], reporting pt's Hgb continues to be low at 8.7 and now his platelets are 79 (down from 141 last week). Notified Dr. Pineda who stated pt should not proceed with Xofigo tomorrow. Notified Giancarlo with nuclear medicine who stated the Xofigo tx agent has already shipped but they can issue a credit if pt has cancelled for medical reasons. Writer also spoke with pt's sister, Norma, who stated pt is being followed by his local urologist for a UTI and that he's reporting mild elbow and knee pain rated 3/10. Advised Norma to follow-up with local oncologist to make a plan for ongoing tx and that Dr. Pineda will be updated. Sabrina updated as well and stated she will review the next best step as pt will likely need to meet with their MD next.

## 2021-06-03 ENCOUNTER — PATIENT OUTREACH (OUTPATIENT)
Dept: ONCOLOGY | Facility: CLINIC | Age: 76
End: 2021-06-03

## 2021-06-03 NOTE — PROGRESS NOTES
TC to pt's sister, Norma, returning her call regarding upcoming appts at [facility]. Appts cancelled per her request as pt is receiving all care locally in [place] moving forward as he was only at [facility] to get Xofigo and has completed that treatment.

## 2021-06-07 ENCOUNTER — TELEPHONE (OUTPATIENT)
Dept: ONCOLOGY | Facility: CLINIC | Age: 76
End: 2021-06-07

## 2021-06-07 NOTE — TELEPHONE ENCOUNTER
RN CARE COORDINATION        Incoming Call:  Received call for critical lab value for Duane. Platelets 45K. Labs completed on 6/7. Ordered by Dr. Olmedo prior to his departure from the [place]. Duane is no longer getting treatment with the [facility]. He has returned to he oncologist, Dr. Mosley, with [facility].        Plan:  Spoke with GRACE Hernandez with Dr. Mosley and relayed platelets result. She will follow-up with Dr. Mosley for any necessary intervention.         ELANA HowardN, RN  RN Care Coordinator  [facility]

## 2021-09-25 ENCOUNTER — HEALTH MAINTENANCE LETTER (OUTPATIENT)
Age: 76
End: 2021-09-25

## 2022-05-01 ENCOUNTER — HEALTH MAINTENANCE LETTER (OUTPATIENT)
Age: 77
End: 2022-05-01

## 2022-12-26 ENCOUNTER — HEALTH MAINTENANCE LETTER (OUTPATIENT)
Age: 77
End: 2022-12-26

## 2023-06-02 ENCOUNTER — HEALTH MAINTENANCE LETTER (OUTPATIENT)
Age: 78
End: 2023-06-02